# Patient Record
Sex: FEMALE | Race: WHITE | NOT HISPANIC OR LATINO | Employment: UNEMPLOYED | URBAN - METROPOLITAN AREA
[De-identification: names, ages, dates, MRNs, and addresses within clinical notes are randomized per-mention and may not be internally consistent; named-entity substitution may affect disease eponyms.]

---

## 2022-07-21 ENCOUNTER — HOSPITAL ENCOUNTER (INPATIENT)
Facility: MEDICAL CENTER | Age: 22
LOS: 3 days | DRG: 472 | End: 2022-07-24
Attending: EMERGENCY MEDICINE | Admitting: SURGERY
Payer: COMMERCIAL

## 2022-07-21 ENCOUNTER — HOSPITAL ENCOUNTER (OUTPATIENT)
Dept: RADIOLOGY | Facility: MEDICAL CENTER | Age: 22
End: 2022-07-21

## 2022-07-21 ENCOUNTER — APPOINTMENT (OUTPATIENT)
Dept: RADIOLOGY | Facility: MEDICAL CENTER | Age: 22
DRG: 472 | End: 2022-07-21
Payer: COMMERCIAL

## 2022-07-21 ENCOUNTER — APPOINTMENT (OUTPATIENT)
Dept: RADIOLOGY | Facility: MEDICAL CENTER | Age: 22
DRG: 472 | End: 2022-07-21
Attending: NEUROLOGICAL SURGERY
Payer: COMMERCIAL

## 2022-07-21 DIAGNOSIS — K59.03 DRUG-INDUCED CONSTIPATION: ICD-10-CM

## 2022-07-21 DIAGNOSIS — S12.500A CLOSED DISPLACED FRACTURE OF SIXTH CERVICAL VERTEBRA, UNSPECIFIED FRACTURE MORPHOLOGY, INITIAL ENCOUNTER (HCC): ICD-10-CM

## 2022-07-21 DIAGNOSIS — R11.0 NAUSEA: ICD-10-CM

## 2022-07-21 PROBLEM — T14.90XA TRAUMA: Status: ACTIVE | Noted: 2022-07-21

## 2022-07-21 PROBLEM — Z53.09 CONTRAINDICATION TO DEEP VEIN THROMBOSIS (DVT) PROPHYLAXIS: Status: ACTIVE | Noted: 2022-07-21

## 2022-07-21 PROBLEM — Z86.69 HISTORY OF CHIARI MALFORMATION: Status: ACTIVE | Noted: 2022-07-21

## 2022-07-21 LAB
ABO + RH BLD: NORMAL
ABO GROUP BLD: NORMAL
ALBUMIN SERPL BCP-MCNC: 4.6 G/DL (ref 3.2–4.9)
ALBUMIN/GLOB SERPL: 1.8 G/DL
ALP SERPL-CCNC: 67 U/L (ref 30–99)
ALT SERPL-CCNC: 7 U/L (ref 2–50)
ANION GAP SERPL CALC-SCNC: 19 MMOL/L (ref 7–16)
APTT PPP: 26.8 SEC (ref 24.7–36)
AST SERPL-CCNC: 22 U/L (ref 12–45)
BILIRUB SERPL-MCNC: 2.1 MG/DL (ref 0.1–1.5)
BLD GP AB SCN SERPL QL: NORMAL
BUN SERPL-MCNC: 13 MG/DL (ref 8–22)
CALCIUM SERPL-MCNC: 8.9 MG/DL (ref 8.5–10.5)
CHLORIDE SERPL-SCNC: 109 MMOL/L (ref 96–112)
CO2 SERPL-SCNC: 13 MMOL/L (ref 20–33)
CREAT SERPL-MCNC: 0.96 MG/DL (ref 0.5–1.4)
ERYTHROCYTE [DISTWIDTH] IN BLOOD BY AUTOMATED COUNT: 38.8 FL (ref 35.9–50)
ETHANOL BLD-MCNC: <10.1 MG/DL
GFR SERPLBLD CREATININE-BSD FMLA CKD-EPI: 86 ML/MIN/1.73 M 2
GLOBULIN SER CALC-MCNC: 2.5 G/DL (ref 1.9–3.5)
GLUCOSE BLD STRIP.AUTO-MCNC: 104 MG/DL (ref 65–99)
GLUCOSE SERPL-MCNC: 100 MG/DL (ref 65–99)
HCG SERPL QL: NEGATIVE
HCT VFR BLD AUTO: 37.7 % (ref 37–47)
HGB BLD-MCNC: 14.1 G/DL (ref 12–16)
INR PPP: 1.14 (ref 0.87–1.13)
MCH RBC QN AUTO: 32 PG (ref 27–33)
MCHC RBC AUTO-ENTMCNC: 37.4 G/DL (ref 33.6–35)
MCV RBC AUTO: 85.7 FL (ref 81.4–97.8)
PLATELET # BLD AUTO: 274 K/UL (ref 164–446)
PMV BLD AUTO: 9.6 FL (ref 9–12.9)
POTASSIUM SERPL-SCNC: 2.9 MMOL/L (ref 3.6–5.5)
PROT SERPL-MCNC: 7.1 G/DL (ref 6–8.2)
PROTHROMBIN TIME: 14.4 SEC (ref 12–14.6)
RBC # BLD AUTO: 4.4 M/UL (ref 4.2–5.4)
RH BLD: NORMAL
SODIUM SERPL-SCNC: 141 MMOL/L (ref 135–145)
WBC # BLD AUTO: 16.4 K/UL (ref 4.8–10.8)

## 2022-07-21 PROCEDURE — 80053 COMPREHEN METABOLIC PANEL: CPT

## 2022-07-21 PROCEDURE — G0390 TRAUMA RESPONS W/HOSP CRITI: HCPCS

## 2022-07-21 PROCEDURE — 72141 MRI NECK SPINE W/O DYE: CPT

## 2022-07-21 PROCEDURE — 36415 COLL VENOUS BLD VENIPUNCTURE: CPT

## 2022-07-21 PROCEDURE — A9270 NON-COVERED ITEM OR SERVICE: HCPCS | Performed by: PHYSICIAN ASSISTANT

## 2022-07-21 PROCEDURE — 85730 THROMBOPLASTIN TIME PARTIAL: CPT

## 2022-07-21 PROCEDURE — 700105 HCHG RX REV CODE 258: Performed by: PHYSICIAN ASSISTANT

## 2022-07-21 PROCEDURE — 770022 HCHG ROOM/CARE - ICU (200)

## 2022-07-21 PROCEDURE — 99223 1ST HOSP IP/OBS HIGH 75: CPT | Mod: AI | Performed by: SURGERY

## 2022-07-21 PROCEDURE — 700102 HCHG RX REV CODE 250 W/ 637 OVERRIDE(OP): Performed by: PHYSICIAN ASSISTANT

## 2022-07-21 PROCEDURE — 85610 PROTHROMBIN TIME: CPT

## 2022-07-21 PROCEDURE — 86850 RBC ANTIBODY SCREEN: CPT

## 2022-07-21 PROCEDURE — 85027 COMPLETE CBC AUTOMATED: CPT

## 2022-07-21 PROCEDURE — 82962 GLUCOSE BLOOD TEST: CPT

## 2022-07-21 PROCEDURE — 51798 US URINE CAPACITY MEASURE: CPT

## 2022-07-21 PROCEDURE — 86900 BLOOD TYPING SEROLOGIC ABO: CPT

## 2022-07-21 PROCEDURE — 700111 HCHG RX REV CODE 636 W/ 250 OVERRIDE (IP): Performed by: SURGERY

## 2022-07-21 PROCEDURE — 84703 CHORIONIC GONADOTROPIN ASSAY: CPT

## 2022-07-21 PROCEDURE — 96374 THER/PROPH/DIAG INJ IV PUSH: CPT

## 2022-07-21 PROCEDURE — 99291 CRITICAL CARE FIRST HOUR: CPT

## 2022-07-21 PROCEDURE — 82077 ASSAY SPEC XCP UR&BREATH IA: CPT

## 2022-07-21 PROCEDURE — 86901 BLOOD TYPING SEROLOGIC RH(D): CPT

## 2022-07-21 PROCEDURE — 700111 HCHG RX REV CODE 636 W/ 250 OVERRIDE (IP): Mod: JZ | Performed by: PHYSICIAN ASSISTANT

## 2022-07-21 RX ORDER — ACETAMINOPHEN 500 MG
1000 TABLET ORAL EVERY 6 HOURS PRN
Status: DISCONTINUED | OUTPATIENT
Start: 2022-07-26 | End: 2022-07-24 | Stop reason: HOSPADM

## 2022-07-21 RX ORDER — FAMOTIDINE 20 MG/1
20 TABLET, FILM COATED ORAL 2 TIMES DAILY
Status: DISCONTINUED | OUTPATIENT
Start: 2022-07-21 | End: 2022-07-23

## 2022-07-21 RX ORDER — PROCHLORPERAZINE EDISYLATE 5 MG/ML
5-10 INJECTION INTRAMUSCULAR; INTRAVENOUS EVERY 4 HOURS PRN
Status: DISCONTINUED | OUTPATIENT
Start: 2022-07-21 | End: 2022-07-22

## 2022-07-21 RX ORDER — POLYETHYLENE GLYCOL 3350 17 G/17G
1 POWDER, FOR SOLUTION ORAL 2 TIMES DAILY
Status: DISCONTINUED | OUTPATIENT
Start: 2022-07-22 | End: 2022-07-24 | Stop reason: HOSPADM

## 2022-07-21 RX ORDER — LORAZEPAM 2 MG/ML
0.5 INJECTION INTRAMUSCULAR ONCE
Status: COMPLETED | OUTPATIENT
Start: 2022-07-21 | End: 2022-07-21

## 2022-07-21 RX ORDER — GABAPENTIN 100 MG/1
100 CAPSULE ORAL EVERY 8 HOURS
Status: DISCONTINUED | OUTPATIENT
Start: 2022-07-21 | End: 2022-07-24 | Stop reason: HOSPADM

## 2022-07-21 RX ORDER — ACETAMINOPHEN 500 MG
1000 TABLET ORAL EVERY 6 HOURS
Status: DISCONTINUED | OUTPATIENT
Start: 2022-07-21 | End: 2022-07-24 | Stop reason: HOSPADM

## 2022-07-21 RX ORDER — DOCUSATE SODIUM 100 MG/1
100 CAPSULE, LIQUID FILLED ORAL 2 TIMES DAILY
Status: DISCONTINUED | OUTPATIENT
Start: 2022-07-22 | End: 2022-07-22

## 2022-07-21 RX ORDER — SERTRALINE HYDROCHLORIDE 100 MG/1
100 TABLET, FILM COATED ORAL DAILY
COMMUNITY

## 2022-07-21 RX ORDER — HYDROMORPHONE HYDROCHLORIDE 1 MG/ML
0.5 INJECTION, SOLUTION INTRAMUSCULAR; INTRAVENOUS; SUBCUTANEOUS
Status: DISCONTINUED | OUTPATIENT
Start: 2022-07-21 | End: 2022-07-24 | Stop reason: HOSPADM

## 2022-07-21 RX ORDER — AMOXICILLIN 250 MG
1 CAPSULE ORAL
Status: DISCONTINUED | OUTPATIENT
Start: 2022-07-21 | End: 2022-07-22

## 2022-07-21 RX ORDER — OXYCODONE HYDROCHLORIDE 10 MG/1
10 TABLET ORAL
Status: DISCONTINUED | OUTPATIENT
Start: 2022-07-21 | End: 2022-07-24 | Stop reason: HOSPADM

## 2022-07-21 RX ORDER — DEXTROSE MONOHYDRATE 25 G/50ML
25 INJECTION, SOLUTION INTRAVENOUS
Status: DISCONTINUED | OUTPATIENT
Start: 2022-07-21 | End: 2022-07-22

## 2022-07-21 RX ORDER — METAXALONE 800 MG/1
800 TABLET ORAL 3 TIMES DAILY
Status: DISCONTINUED | OUTPATIENT
Start: 2022-07-21 | End: 2022-07-24 | Stop reason: HOSPADM

## 2022-07-21 RX ORDER — LIDOCAINE 50 MG/G
1 PATCH TOPICAL EVERY 24 HOURS
Status: DISCONTINUED | OUTPATIENT
Start: 2022-07-22 | End: 2022-07-24 | Stop reason: HOSPADM

## 2022-07-21 RX ORDER — AMOXICILLIN 250 MG
1 CAPSULE ORAL NIGHTLY
Status: DISCONTINUED | OUTPATIENT
Start: 2022-07-21 | End: 2022-07-22

## 2022-07-21 RX ORDER — OXYCODONE HYDROCHLORIDE 5 MG/1
5 TABLET ORAL
Status: DISCONTINUED | OUTPATIENT
Start: 2022-07-21 | End: 2022-07-24 | Stop reason: HOSPADM

## 2022-07-21 RX ORDER — BISACODYL 10 MG
10 SUPPOSITORY, RECTAL RECTAL
Status: DISCONTINUED | OUTPATIENT
Start: 2022-07-21 | End: 2022-07-22

## 2022-07-21 RX ORDER — SODIUM CHLORIDE 9 MG/ML
INJECTION, SOLUTION INTRAVENOUS CONTINUOUS
Status: DISCONTINUED | OUTPATIENT
Start: 2022-07-21 | End: 2022-07-23

## 2022-07-21 RX ADMIN — LORAZEPAM 0.5 MG: 2 INJECTION INTRAMUSCULAR; INTRAVENOUS at 19:20

## 2022-07-21 RX ADMIN — HYDROMORPHONE HYDROCHLORIDE 0.5 MG: 1 INJECTION, SOLUTION INTRAMUSCULAR; INTRAVENOUS; SUBCUTANEOUS at 21:47

## 2022-07-21 RX ADMIN — SODIUM CHLORIDE: 9 INJECTION, SOLUTION INTRAVENOUS at 20:34

## 2022-07-21 RX ADMIN — OXYCODONE HYDROCHLORIDE 10 MG: 10 TABLET ORAL at 20:34

## 2022-07-21 RX ADMIN — GABAPENTIN 100 MG: 100 CAPSULE ORAL at 21:11

## 2022-07-21 RX ADMIN — FAMOTIDINE 20 MG: 20 TABLET, FILM COATED ORAL at 20:34

## 2022-07-21 RX ADMIN — PROCHLORPERAZINE EDISYLATE 5 MG: 5 INJECTION INTRAMUSCULAR; INTRAVENOUS at 21:17

## 2022-07-21 RX ADMIN — METAXALONE 800 MG: 800 TABLET ORAL at 20:33

## 2022-07-21 RX ADMIN — ACETAMINOPHEN 1000 MG: 500 TABLET, FILM COATED ORAL at 20:33

## 2022-07-21 ASSESSMENT — PAIN DESCRIPTION - PAIN TYPE
TYPE: ACUTE PAIN

## 2022-07-21 ASSESSMENT — FIBROSIS 4 INDEX: FIB4 SCORE: 0.64

## 2022-07-21 ASSESSMENT — PATIENT HEALTH QUESTIONNAIRE - PHQ9
2. FEELING DOWN, DEPRESSED, IRRITABLE, OR HOPELESS: NOT AT ALL
1. LITTLE INTEREST OR PLEASURE IN DOING THINGS: NOT AT ALL
SUM OF ALL RESPONSES TO PHQ9 QUESTIONS 1 AND 2: 0

## 2022-07-21 ASSESSMENT — COPD QUESTIONNAIRES
DURING THE PAST 4 WEEKS HOW MUCH DID YOU FEEL SHORT OF BREATH: NONE/LITTLE OF THE TIME
COPD SCREENING SCORE: 0
HAVE YOU SMOKED AT LEAST 100 CIGARETTES IN YOUR ENTIRE LIFE: NO/DON'T KNOW
DO YOU EVER COUGH UP ANY MUCUS OR PHLEGM?: NO/ONLY WITH OCCASIONAL COLDS OR INFECTIONS

## 2022-07-21 ASSESSMENT — LIFESTYLE VARIABLES: EVER_SMOKED: NEVER

## 2022-07-22 ENCOUNTER — APPOINTMENT (OUTPATIENT)
Dept: RADIOLOGY | Facility: MEDICAL CENTER | Age: 22
DRG: 472 | End: 2022-07-22
Attending: NEUROLOGICAL SURGERY
Payer: COMMERCIAL

## 2022-07-22 ENCOUNTER — ANESTHESIA (OUTPATIENT)
Dept: SURGERY | Facility: MEDICAL CENTER | Age: 22
DRG: 472 | End: 2022-07-22
Payer: COMMERCIAL

## 2022-07-22 ENCOUNTER — ANESTHESIA EVENT (OUTPATIENT)
Dept: SURGERY | Facility: MEDICAL CENTER | Age: 22
DRG: 472 | End: 2022-07-22
Payer: COMMERCIAL

## 2022-07-22 PROBLEM — F32.A DEPRESSION: Status: ACTIVE | Noted: 2022-07-22

## 2022-07-22 PROBLEM — Z78.9 NO CONTRAINDICATION TO DEEP VEIN THROMBOSIS (DVT) PROPHYLAXIS: Status: ACTIVE | Noted: 2022-07-21

## 2022-07-22 LAB
ALBUMIN SERPL BCP-MCNC: 4 G/DL (ref 3.2–4.9)
ALBUMIN/GLOB SERPL: 1.8 G/DL
ALP SERPL-CCNC: 57 U/L (ref 30–99)
ALT SERPL-CCNC: 5 U/L (ref 2–50)
ANION GAP SERPL CALC-SCNC: 12 MMOL/L (ref 7–16)
AST SERPL-CCNC: 17 U/L (ref 12–45)
BASOPHILS # BLD AUTO: 0.4 % (ref 0–1.8)
BASOPHILS # BLD: 0.03 K/UL (ref 0–0.12)
BILIRUB SERPL-MCNC: 1.3 MG/DL (ref 0.1–1.5)
BUN SERPL-MCNC: 11 MG/DL (ref 8–22)
CALCIUM SERPL-MCNC: 8.5 MG/DL (ref 8.5–10.5)
CHLORIDE SERPL-SCNC: 112 MMOL/L (ref 96–112)
CO2 SERPL-SCNC: 18 MMOL/L (ref 20–33)
CREAT SERPL-MCNC: 0.73 MG/DL (ref 0.5–1.4)
EOSINOPHIL # BLD AUTO: 0.05 K/UL (ref 0–0.51)
EOSINOPHIL NFR BLD: 0.7 % (ref 0–6.9)
ERYTHROCYTE [DISTWIDTH] IN BLOOD BY AUTOMATED COUNT: 41.2 FL (ref 35.9–50)
GFR SERPLBLD CREATININE-BSD FMLA CKD-EPI: 120 ML/MIN/1.73 M 2
GLOBULIN SER CALC-MCNC: 2.2 G/DL (ref 1.9–3.5)
GLUCOSE BLD STRIP.AUTO-MCNC: 109 MG/DL (ref 65–99)
GLUCOSE BLD STRIP.AUTO-MCNC: 89 MG/DL (ref 65–99)
GLUCOSE BLD STRIP.AUTO-MCNC: 94 MG/DL (ref 65–99)
GLUCOSE BLD STRIP.AUTO-MCNC: 96 MG/DL (ref 65–99)
GLUCOSE SERPL-MCNC: 94 MG/DL (ref 65–99)
HCT VFR BLD AUTO: 35.9 % (ref 37–47)
HGB BLD-MCNC: 13.2 G/DL (ref 12–16)
IMM GRANULOCYTES # BLD AUTO: 0.03 K/UL (ref 0–0.11)
IMM GRANULOCYTES NFR BLD AUTO: 0.4 % (ref 0–0.9)
LYMPHOCYTES # BLD AUTO: 1.22 K/UL (ref 1–4.8)
LYMPHOCYTES NFR BLD: 17.4 % (ref 22–41)
MCH RBC QN AUTO: 32.2 PG (ref 27–33)
MCHC RBC AUTO-ENTMCNC: 36.8 G/DL (ref 33.6–35)
MCV RBC AUTO: 87.6 FL (ref 81.4–97.8)
MONOCYTES # BLD AUTO: 0.69 K/UL (ref 0–0.85)
MONOCYTES NFR BLD AUTO: 9.9 % (ref 0–13.4)
NEUTROPHILS # BLD AUTO: 4.98 K/UL (ref 2–7.15)
NEUTROPHILS NFR BLD: 71.2 % (ref 44–72)
NRBC # BLD AUTO: 0 K/UL
NRBC BLD-RTO: 0 /100 WBC
PLATELET # BLD AUTO: 209 K/UL (ref 164–446)
PMV BLD AUTO: 9.4 FL (ref 9–12.9)
POTASSIUM SERPL-SCNC: 3.9 MMOL/L (ref 3.6–5.5)
PROT SERPL-MCNC: 6.2 G/DL (ref 6–8.2)
RBC # BLD AUTO: 4.1 M/UL (ref 4.2–5.4)
SODIUM SERPL-SCNC: 142 MMOL/L (ref 135–145)
WBC # BLD AUTO: 7 K/UL (ref 4.8–10.8)

## 2022-07-22 PROCEDURE — 700105 HCHG RX REV CODE 258: Performed by: ANESTHESIOLOGY

## 2022-07-22 PROCEDURE — 72040 X-RAY EXAM NECK SPINE 2-3 VW: CPT

## 2022-07-22 PROCEDURE — 00600 ANES PX CRV SPINE&CORD NOS: CPT | Performed by: ANESTHESIOLOGY

## 2022-07-22 PROCEDURE — 95938 SOMATOSENSORY TESTING: CPT | Performed by: NEUROLOGICAL SURGERY

## 2022-07-22 PROCEDURE — 700101 HCHG RX REV CODE 250

## 2022-07-22 PROCEDURE — 95867 NDL EMG CRANIAL NRV MUSC UNI: CPT | Performed by: NEUROLOGICAL SURGERY

## 2022-07-22 PROCEDURE — 82962 GLUCOSE BLOOD TEST: CPT

## 2022-07-22 PROCEDURE — A9270 NON-COVERED ITEM OR SERVICE: HCPCS | Performed by: PHYSICIAN ASSISTANT

## 2022-07-22 PROCEDURE — 160041 HCHG SURGERY MINUTES - EA ADDL 1 MIN LEVEL 4: Performed by: NEUROLOGICAL SURGERY

## 2022-07-22 PROCEDURE — 95955 EEG DURING SURGERY: CPT | Performed by: NEUROLOGICAL SURGERY

## 2022-07-22 PROCEDURE — 700101 HCHG RX REV CODE 250: Performed by: NEUROLOGICAL SURGERY

## 2022-07-22 PROCEDURE — 0RG20A0 FUSION OF 2 OR MORE CERVICAL VERTEBRAL JOINTS WITH INTERBODY FUSION DEVICE, ANTERIOR APPROACH, ANTERIOR COLUMN, OPEN APPROACH: ICD-10-PCS | Performed by: NEUROLOGICAL SURGERY

## 2022-07-22 PROCEDURE — 700111 HCHG RX REV CODE 636 W/ 250 OVERRIDE (IP): Mod: JZ

## 2022-07-22 PROCEDURE — 770022 HCHG ROOM/CARE - ICU (200)

## 2022-07-22 PROCEDURE — 502000 HCHG MISC OR IMPLANTS RC 0278: Performed by: NEUROLOGICAL SURGERY

## 2022-07-22 PROCEDURE — 4A11X4G MONITORING OF PERIPHERAL NERVOUS ELECTRICAL ACTIVITY, INTRAOPERATIVE, EXTERNAL APPROACH: ICD-10-PCS | Performed by: NEUROLOGICAL SURGERY

## 2022-07-22 PROCEDURE — 99233 SBSQ HOSP IP/OBS HIGH 50: CPT | Performed by: SURGERY

## 2022-07-22 PROCEDURE — 160009 HCHG ANES TIME/MIN: Performed by: NEUROLOGICAL SURGERY

## 2022-07-22 PROCEDURE — 160048 HCHG OR STATISTICAL LEVEL 1-5: Performed by: NEUROLOGICAL SURGERY

## 2022-07-22 PROCEDURE — 700101 HCHG RX REV CODE 250: Performed by: PHYSICIAN ASSISTANT

## 2022-07-22 PROCEDURE — 95939 C MOTOR EVOKED UPR&LWR LIMBS: CPT | Performed by: NEUROLOGICAL SURGERY

## 2022-07-22 PROCEDURE — 700111 HCHG RX REV CODE 636 W/ 250 OVERRIDE (IP): Performed by: PHYSICIAN ASSISTANT

## 2022-07-22 PROCEDURE — 80053 COMPREHEN METABOLIC PANEL: CPT

## 2022-07-22 PROCEDURE — 160029 HCHG SURGERY MINUTES - 1ST 30 MINS LEVEL 4: Performed by: NEUROLOGICAL SURGERY

## 2022-07-22 PROCEDURE — C1713 ANCHOR/SCREW BN/BN,TIS/BN: HCPCS | Performed by: NEUROLOGICAL SURGERY

## 2022-07-22 PROCEDURE — 700111 HCHG RX REV CODE 636 W/ 250 OVERRIDE (IP): Mod: JZ | Performed by: NEUROLOGICAL SURGERY

## 2022-07-22 PROCEDURE — 0RB30ZZ EXCISION OF CERVICAL VERTEBRAL DISC, OPEN APPROACH: ICD-10-PCS | Performed by: NEUROLOGICAL SURGERY

## 2022-07-22 PROCEDURE — 85025 COMPLETE CBC W/AUTO DIFF WBC: CPT

## 2022-07-22 PROCEDURE — 160035 HCHG PACU - 1ST 60 MINS PHASE I: Performed by: NEUROLOGICAL SURGERY

## 2022-07-22 PROCEDURE — 700111 HCHG RX REV CODE 636 W/ 250 OVERRIDE (IP): Performed by: ANESTHESIOLOGY

## 2022-07-22 PROCEDURE — 700101 HCHG RX REV CODE 250: Performed by: ANESTHESIOLOGY

## 2022-07-22 PROCEDURE — C1821 INTERSPINOUS IMPLANT: HCPCS | Performed by: NEUROLOGICAL SURGERY

## 2022-07-22 PROCEDURE — 700105 HCHG RX REV CODE 258

## 2022-07-22 PROCEDURE — 160002 HCHG RECOVERY MINUTES (STAT): Performed by: NEUROLOGICAL SURGERY

## 2022-07-22 PROCEDURE — 95940 IONM IN OPERATNG ROOM 15 MIN: CPT | Performed by: NEUROLOGICAL SURGERY

## 2022-07-22 PROCEDURE — 95937 NEUROMUSCULAR JUNCTION TEST: CPT | Performed by: NEUROLOGICAL SURGERY

## 2022-07-22 PROCEDURE — 00NW0ZZ RELEASE CERVICAL SPINAL CORD, OPEN APPROACH: ICD-10-PCS | Performed by: NEUROLOGICAL SURGERY

## 2022-07-22 PROCEDURE — 110371 HCHG SHELL REV 272: Performed by: NEUROLOGICAL SURGERY

## 2022-07-22 PROCEDURE — 700105 HCHG RX REV CODE 258: Performed by: PHYSICIAN ASSISTANT

## 2022-07-22 PROCEDURE — 95861 NEEDLE EMG 2 EXTREMITIES: CPT | Performed by: NEUROLOGICAL SURGERY

## 2022-07-22 PROCEDURE — 700102 HCHG RX REV CODE 250 W/ 637 OVERRIDE(OP): Performed by: PHYSICIAN ASSISTANT

## 2022-07-22 PROCEDURE — 110454 HCHG SHELL REV 250: Performed by: NEUROLOGICAL SURGERY

## 2022-07-22 DEVICE — GRAFT BONE PASTE GRAFTON PLUS 1CC (1EA): Type: IMPLANTABLE DEVICE | Site: SPINE CERVICAL | Status: FUNCTIONAL

## 2022-07-22 DEVICE — IMPLANTABLE DEVICE: Type: IMPLANTABLE DEVICE | Site: SPINE CERVICAL | Status: FUNCTIONAL

## 2022-07-22 RX ORDER — PROMETHAZINE HYDROCHLORIDE 25 MG/1
12.5-25 TABLET ORAL EVERY 4 HOURS PRN
Status: DISCONTINUED | OUTPATIENT
Start: 2022-07-22 | End: 2022-07-24 | Stop reason: HOSPADM

## 2022-07-22 RX ORDER — AMOXICILLIN 250 MG
1 CAPSULE ORAL NIGHTLY
Status: DISCONTINUED | OUTPATIENT
Start: 2022-07-22 | End: 2022-07-24 | Stop reason: HOSPADM

## 2022-07-22 RX ORDER — PHENYLEPHRINE HCL IN 0.9% NACL 0.5 MG/5ML
SYRINGE (ML) INTRAVENOUS PRN
Status: DISCONTINUED | OUTPATIENT
Start: 2022-07-22 | End: 2022-07-22 | Stop reason: SURG

## 2022-07-22 RX ORDER — CEFAZOLIN SODIUM 1 G/3ML
INJECTION, POWDER, FOR SOLUTION INTRAMUSCULAR; INTRAVENOUS
Status: DISCONTINUED | OUTPATIENT
Start: 2022-07-22 | End: 2022-07-22 | Stop reason: HOSPADM

## 2022-07-22 RX ORDER — BISACODYL 10 MG
10 SUPPOSITORY, RECTAL RECTAL
Status: DISCONTINUED | OUTPATIENT
Start: 2022-07-22 | End: 2022-07-24 | Stop reason: HOSPADM

## 2022-07-22 RX ORDER — OXYCODONE HCL 5 MG/5 ML
10 SOLUTION, ORAL ORAL
Status: DISCONTINUED | OUTPATIENT
Start: 2022-07-22 | End: 2022-07-22 | Stop reason: HOSPADM

## 2022-07-22 RX ORDER — CALCIUM CARBONATE 500 MG/1
500 TABLET, CHEWABLE ORAL 2 TIMES DAILY
Status: DISCONTINUED | OUTPATIENT
Start: 2022-07-22 | End: 2022-07-24 | Stop reason: HOSPADM

## 2022-07-22 RX ORDER — METHOCARBAMOL 750 MG/1
750 TABLET, FILM COATED ORAL EVERY 8 HOURS PRN
Status: DISCONTINUED | OUTPATIENT
Start: 2022-07-22 | End: 2022-07-23

## 2022-07-22 RX ORDER — PROMETHAZINE HYDROCHLORIDE 25 MG/1
12.5-25 SUPPOSITORY RECTAL EVERY 4 HOURS PRN
Status: DISCONTINUED | OUTPATIENT
Start: 2022-07-22 | End: 2022-07-24 | Stop reason: HOSPADM

## 2022-07-22 RX ORDER — HALOPERIDOL 5 MG/ML
1 INJECTION INTRAMUSCULAR
Status: DISCONTINUED | OUTPATIENT
Start: 2022-07-22 | End: 2022-07-22 | Stop reason: HOSPADM

## 2022-07-22 RX ORDER — DOCUSATE SODIUM 100 MG/1
100 CAPSULE, LIQUID FILLED ORAL 2 TIMES DAILY
Status: DISCONTINUED | OUTPATIENT
Start: 2022-07-22 | End: 2022-07-24 | Stop reason: HOSPADM

## 2022-07-22 RX ORDER — SODIUM CHLORIDE, SODIUM LACTATE, POTASSIUM CHLORIDE, CALCIUM CHLORIDE 600; 310; 30; 20 MG/100ML; MG/100ML; MG/100ML; MG/100ML
INJECTION, SOLUTION INTRAVENOUS
Status: DISCONTINUED | OUTPATIENT
Start: 2022-07-22 | End: 2022-07-22 | Stop reason: SURG

## 2022-07-22 RX ORDER — DEXAMETHASONE SODIUM PHOSPHATE 4 MG/ML
INJECTION, SOLUTION INTRA-ARTICULAR; INTRALESIONAL; INTRAMUSCULAR; INTRAVENOUS; SOFT TISSUE PRN
Status: DISCONTINUED | OUTPATIENT
Start: 2022-07-22 | End: 2022-07-22 | Stop reason: SURG

## 2022-07-22 RX ORDER — CEFAZOLIN SODIUM 1 G/3ML
INJECTION, POWDER, FOR SOLUTION INTRAMUSCULAR; INTRAVENOUS PRN
Status: DISCONTINUED | OUTPATIENT
Start: 2022-07-22 | End: 2022-07-22 | Stop reason: SURG

## 2022-07-22 RX ORDER — LABETALOL HYDROCHLORIDE 5 MG/ML
10 INJECTION, SOLUTION INTRAVENOUS
Status: DISCONTINUED | OUTPATIENT
Start: 2022-07-22 | End: 2022-07-23

## 2022-07-22 RX ORDER — DIPHENHYDRAMINE HCL 25 MG
25 TABLET ORAL EVERY 6 HOURS PRN
Status: DISCONTINUED | OUTPATIENT
Start: 2022-07-22 | End: 2022-07-24 | Stop reason: HOSPADM

## 2022-07-22 RX ORDER — POLYETHYLENE GLYCOL 3350 17 G/17G
1 POWDER, FOR SOLUTION ORAL 2 TIMES DAILY PRN
Status: DISCONTINUED | OUTPATIENT
Start: 2022-07-22 | End: 2022-07-24 | Stop reason: HOSPADM

## 2022-07-22 RX ORDER — MIDAZOLAM HYDROCHLORIDE 1 MG/ML
1 INJECTION INTRAMUSCULAR; INTRAVENOUS
Status: DISCONTINUED | OUTPATIENT
Start: 2022-07-22 | End: 2022-07-22 | Stop reason: HOSPADM

## 2022-07-22 RX ORDER — OXYCODONE HCL 5 MG/5 ML
5 SOLUTION, ORAL ORAL
Status: DISCONTINUED | OUTPATIENT
Start: 2022-07-22 | End: 2022-07-22 | Stop reason: HOSPADM

## 2022-07-22 RX ORDER — MIDAZOLAM HYDROCHLORIDE 1 MG/ML
INJECTION INTRAMUSCULAR; INTRAVENOUS PRN
Status: DISCONTINUED | OUTPATIENT
Start: 2022-07-22 | End: 2022-07-22 | Stop reason: HOSPADM

## 2022-07-22 RX ORDER — HYDROMORPHONE HYDROCHLORIDE 1 MG/ML
0.4 INJECTION, SOLUTION INTRAMUSCULAR; INTRAVENOUS; SUBCUTANEOUS
Status: DISCONTINUED | OUTPATIENT
Start: 2022-07-22 | End: 2022-07-22 | Stop reason: HOSPADM

## 2022-07-22 RX ORDER — MEPERIDINE HYDROCHLORIDE 25 MG/ML
12.5 INJECTION INTRAMUSCULAR; INTRAVENOUS; SUBCUTANEOUS
Status: DISCONTINUED | OUTPATIENT
Start: 2022-07-22 | End: 2022-07-22 | Stop reason: HOSPADM

## 2022-07-22 RX ORDER — METOPROLOL TARTRATE 1 MG/ML
1 INJECTION, SOLUTION INTRAVENOUS
Status: DISCONTINUED | OUTPATIENT
Start: 2022-07-22 | End: 2022-07-22 | Stop reason: HOSPADM

## 2022-07-22 RX ORDER — BUPIVACAINE HYDROCHLORIDE AND EPINEPHRINE 5; 5 MG/ML; UG/ML
INJECTION, SOLUTION EPIDURAL; INTRACAUDAL; PERINEURAL
Status: DISCONTINUED | OUTPATIENT
Start: 2022-07-22 | End: 2022-07-22 | Stop reason: HOSPADM

## 2022-07-22 RX ORDER — PROCHLORPERAZINE EDISYLATE 5 MG/ML
5-10 INJECTION INTRAMUSCULAR; INTRAVENOUS EVERY 4 HOURS PRN
Status: DISCONTINUED | OUTPATIENT
Start: 2022-07-22 | End: 2022-07-24 | Stop reason: HOSPADM

## 2022-07-22 RX ORDER — DIPHENHYDRAMINE HYDROCHLORIDE 50 MG/ML
25 INJECTION, SOLUTION INTRAMUSCULAR; INTRAVENOUS EVERY 6 HOURS PRN
Status: DISCONTINUED | OUTPATIENT
Start: 2022-07-22 | End: 2022-07-24 | Stop reason: HOSPADM

## 2022-07-22 RX ORDER — HYDROMORPHONE HYDROCHLORIDE 1 MG/ML
0.1 INJECTION, SOLUTION INTRAMUSCULAR; INTRAVENOUS; SUBCUTANEOUS
Status: DISCONTINUED | OUTPATIENT
Start: 2022-07-22 | End: 2022-07-22 | Stop reason: HOSPADM

## 2022-07-22 RX ORDER — ROCURONIUM BROMIDE 10 MG/ML
INJECTION, SOLUTION INTRAVENOUS PRN
Status: DISCONTINUED | OUTPATIENT
Start: 2022-07-22 | End: 2022-07-22 | Stop reason: SURG

## 2022-07-22 RX ORDER — SERTRALINE HYDROCHLORIDE 100 MG/1
100 TABLET, FILM COATED ORAL DAILY
Status: DISCONTINUED | OUTPATIENT
Start: 2022-07-23 | End: 2022-07-24 | Stop reason: HOSPADM

## 2022-07-22 RX ORDER — HYDRALAZINE HYDROCHLORIDE 20 MG/ML
5 INJECTION INTRAMUSCULAR; INTRAVENOUS
Status: DISCONTINUED | OUTPATIENT
Start: 2022-07-22 | End: 2022-07-22 | Stop reason: HOSPADM

## 2022-07-22 RX ORDER — GLYCOPYRROLATE 0.2 MG/ML
INJECTION INTRAMUSCULAR; INTRAVENOUS PRN
Status: DISCONTINUED | OUTPATIENT
Start: 2022-07-22 | End: 2022-07-22 | Stop reason: SURG

## 2022-07-22 RX ORDER — DIPHENHYDRAMINE HYDROCHLORIDE 50 MG/ML
12.5 INJECTION, SOLUTION INTRAMUSCULAR; INTRAVENOUS
Status: DISCONTINUED | OUTPATIENT
Start: 2022-07-22 | End: 2022-07-22 | Stop reason: HOSPADM

## 2022-07-22 RX ORDER — AMOXICILLIN 250 MG
1 CAPSULE ORAL
Status: DISCONTINUED | OUTPATIENT
Start: 2022-07-22 | End: 2022-07-24 | Stop reason: HOSPADM

## 2022-07-22 RX ORDER — SUCCINYLCHOLINE CHLORIDE 20 MG/ML
INJECTION INTRAMUSCULAR; INTRAVENOUS PRN
Status: DISCONTINUED | OUTPATIENT
Start: 2022-07-22 | End: 2022-07-22 | Stop reason: SURG

## 2022-07-22 RX ORDER — MORPHINE SULFATE 0.5 MG/ML
INJECTION, SOLUTION EPIDURAL; INTRATHECAL; INTRAVENOUS PRN
Status: DISCONTINUED | OUTPATIENT
Start: 2022-07-22 | End: 2022-07-22 | Stop reason: SURG

## 2022-07-22 RX ORDER — IPRATROPIUM BROMIDE AND ALBUTEROL SULFATE 2.5; .5 MG/3ML; MG/3ML
3 SOLUTION RESPIRATORY (INHALATION)
Status: DISCONTINUED | OUTPATIENT
Start: 2022-07-22 | End: 2022-07-22 | Stop reason: HOSPADM

## 2022-07-22 RX ORDER — HYDROMORPHONE HYDROCHLORIDE 1 MG/ML
0.2 INJECTION, SOLUTION INTRAMUSCULAR; INTRAVENOUS; SUBCUTANEOUS
Status: DISCONTINUED | OUTPATIENT
Start: 2022-07-22 | End: 2022-07-22 | Stop reason: HOSPADM

## 2022-07-22 RX ORDER — ENOXAPARIN SODIUM 100 MG/ML
40 INJECTION SUBCUTANEOUS DAILY
Status: DISCONTINUED | OUTPATIENT
Start: 2022-07-23 | End: 2022-07-24 | Stop reason: HOSPADM

## 2022-07-22 RX ORDER — CEFAZOLIN SODIUM 2 G/100ML
2 INJECTION, SOLUTION INTRAVENOUS EVERY 8 HOURS
Status: DISPENSED | OUTPATIENT
Start: 2022-07-22 | End: 2022-07-23

## 2022-07-22 RX ADMIN — SODIUM CHLORIDE, POTASSIUM CHLORIDE, SODIUM LACTATE AND CALCIUM CHLORIDE: 600; 310; 30; 20 INJECTION, SOLUTION INTRAVENOUS at 13:43

## 2022-07-22 RX ADMIN — CEFAZOLIN 2 G: 330 INJECTION, POWDER, FOR SOLUTION INTRAMUSCULAR; INTRAVENOUS at 12:28

## 2022-07-22 RX ADMIN — LIDOCAINE 1 PATCH: 50 PATCH TOPICAL at 05:18

## 2022-07-22 RX ADMIN — ROCURONIUM BROMIDE 10 MG: 10 INJECTION, SOLUTION INTRAVENOUS at 12:08

## 2022-07-22 RX ADMIN — GABAPENTIN 100 MG: 100 CAPSULE ORAL at 22:40

## 2022-07-22 RX ADMIN — FAMOTIDINE 20 MG: 20 TABLET, FILM COATED ORAL at 17:45

## 2022-07-22 RX ADMIN — PROPOFOL 180 MG: 10 INJECTION, EMULSION INTRAVENOUS at 12:08

## 2022-07-22 RX ADMIN — FENTANYL CITRATE 50 MCG: 50 INJECTION, SOLUTION INTRAMUSCULAR; INTRAVENOUS at 12:46

## 2022-07-22 RX ADMIN — PROCHLORPERAZINE EDISYLATE 10 MG: 5 INJECTION INTRAMUSCULAR; INTRAVENOUS at 19:32

## 2022-07-22 RX ADMIN — MORPHINE SULFATE 5 MG: 0.5 INJECTION EPIDURAL; INTRATHECAL; INTRAVENOUS at 14:25

## 2022-07-22 RX ADMIN — METAXALONE 800 MG: 800 TABLET ORAL at 05:18

## 2022-07-22 RX ADMIN — HYDROMORPHONE HYDROCHLORIDE 0.5 MG: 1 INJECTION, SOLUTION INTRAMUSCULAR; INTRAVENOUS; SUBCUTANEOUS at 04:02

## 2022-07-22 RX ADMIN — GABAPENTIN 100 MG: 100 CAPSULE ORAL at 05:18

## 2022-07-22 RX ADMIN — GLYCOPYRROLATE 0.2 MG: 0.2 INJECTION INTRAMUSCULAR; INTRAVENOUS at 12:04

## 2022-07-22 RX ADMIN — ACETAMINOPHEN 1000 MG: 500 TABLET, FILM COATED ORAL at 23:32

## 2022-07-22 RX ADMIN — DEXAMETHASONE SODIUM PHOSPHATE 8 MG: 4 INJECTION, SOLUTION INTRA-ARTICULAR; INTRALESIONAL; INTRAMUSCULAR; INTRAVENOUS; SOFT TISSUE at 12:37

## 2022-07-22 RX ADMIN — METAXALONE 800 MG: 800 TABLET ORAL at 17:45

## 2022-07-22 RX ADMIN — SODIUM CHLORIDE: 9 INJECTION, SOLUTION INTRAVENOUS at 06:48

## 2022-07-22 RX ADMIN — FENTANYL CITRATE 100 MCG: 50 INJECTION, SOLUTION INTRAMUSCULAR; INTRAVENOUS at 12:06

## 2022-07-22 RX ADMIN — ACETAMINOPHEN 1000 MG: 500 TABLET, FILM COATED ORAL at 17:45

## 2022-07-22 RX ADMIN — WATER 2 G: 1000 INJECTION, SOLUTION INTRAVENOUS at 20:18

## 2022-07-22 RX ADMIN — SUCCINYLCHOLINE CHLORIDE 120 MG: 20 INJECTION, SOLUTION INTRAMUSCULAR; INTRAVENOUS; PARENTERAL at 12:08

## 2022-07-22 RX ADMIN — FAMOTIDINE 20 MG: 10 INJECTION, SOLUTION INTRAVENOUS at 05:18

## 2022-07-22 RX ADMIN — HYDROMORPHONE HYDROCHLORIDE 0.5 MG: 1 INJECTION, SOLUTION INTRAMUSCULAR; INTRAVENOUS; SUBCUTANEOUS at 19:24

## 2022-07-22 RX ADMIN — PROCHLORPERAZINE EDISYLATE 10 MG: 5 INJECTION INTRAMUSCULAR; INTRAVENOUS at 10:37

## 2022-07-22 RX ADMIN — SODIUM CHLORIDE, POTASSIUM CHLORIDE, SODIUM LACTATE AND CALCIUM CHLORIDE: 600; 310; 30; 20 INJECTION, SOLUTION INTRAVENOUS at 11:57

## 2022-07-22 RX ADMIN — MIDAZOLAM HYDROCHLORIDE 2 MG: 1 INJECTION, SOLUTION INTRAMUSCULAR; INTRAVENOUS at 11:59

## 2022-07-22 RX ADMIN — HYDROMORPHONE HYDROCHLORIDE 0.5 MG: 1 INJECTION, SOLUTION INTRAMUSCULAR; INTRAVENOUS; SUBCUTANEOUS at 07:07

## 2022-07-22 RX ADMIN — PROCHLORPERAZINE EDISYLATE 10 MG: 5 INJECTION INTRAMUSCULAR; INTRAVENOUS at 04:06

## 2022-07-22 RX ADMIN — ACETAMINOPHEN 1000 MG: 500 TABLET, FILM COATED ORAL at 05:18

## 2022-07-22 RX ADMIN — Medication 200 MCG: at 13:42

## 2022-07-22 RX ADMIN — HYDROMORPHONE HYDROCHLORIDE 0.5 MG: 1 INJECTION, SOLUTION INTRAMUSCULAR; INTRAVENOUS; SUBCUTANEOUS at 10:31

## 2022-07-22 RX ADMIN — FENTANYL CITRATE 100 MCG: 50 INJECTION, SOLUTION INTRAMUSCULAR; INTRAVENOUS at 13:25

## 2022-07-22 ASSESSMENT — PAIN DESCRIPTION - PAIN TYPE
TYPE: ACUTE PAIN
TYPE: SURGICAL PAIN
TYPE: ACUTE PAIN;SURGICAL PAIN
TYPE: SURGICAL PAIN
TYPE: ACUTE PAIN
TYPE: SURGICAL PAIN
TYPE: ACUTE PAIN
TYPE: ACUTE PAIN;SURGICAL PAIN
TYPE: ACUTE PAIN;SURGICAL PAIN
TYPE: SURGICAL PAIN
TYPE: ACUTE PAIN
TYPE: ACUTE PAIN
TYPE: ACUTE PAIN;SURGICAL PAIN
TYPE: ACUTE PAIN
TYPE: SURGICAL PAIN

## 2022-07-22 ASSESSMENT — LIFESTYLE VARIABLES
HAVE PEOPLE ANNOYED YOU BY CRITICIZING YOUR DRINKING: NO
TOTAL SCORE: 0
DOES PATIENT WANT TO STOP DRINKING: NO
EVER FELT BAD OR GUILTY ABOUT YOUR DRINKING: NO
TOTAL SCORE: 0
HAVE YOU EVER FELT YOU SHOULD CUT DOWN ON YOUR DRINKING: NO
ALCOHOL_USE: NO
EVER HAD A DRINK FIRST THING IN THE MORNING TO STEADY YOUR NERVES TO GET RID OF A HANGOVER: NO
ON A TYPICAL DAY WHEN YOU DRINK ALCOHOL HOW MANY DRINKS DO YOU HAVE: 0
TOTAL SCORE: 0
HOW MANY TIMES IN THE PAST YEAR HAVE YOU HAD 5 OR MORE DRINKS IN A DAY: 0
AVERAGE NUMBER OF DAYS PER WEEK YOU HAVE A DRINK CONTAINING ALCOHOL: 0
CONSUMPTION TOTAL: NEGATIVE

## 2022-07-22 ASSESSMENT — ENCOUNTER SYMPTOMS
HEADACHES: 0
MYALGIAS: 1
TINGLING: 1
DIZZINESS: 0
NECK PAIN: 1
ROS SKIN COMMENTS: ABRASION
ABDOMINAL PAIN: 0
SHORTNESS OF BREATH: 0
CHILLS: 0
WEAKNESS: 0
ROS GI COMMENTS: BM PTA
NAUSEA: 0
SENSORY CHANGE: 0
COUGH: 0
FOCAL WEAKNESS: 0
CONSTIPATION: 0
VOMITING: 0
FEVER: 0

## 2022-07-22 ASSESSMENT — PAIN SCALES - GENERAL: PAIN_LEVEL: 0

## 2022-07-22 NOTE — ED NOTES
Med Rec Complete per patient  Allergies Reviewed with patient  No antibiotics within the last 30 days  Patient's Preferred Pharmacy: [facility] [address]

## 2022-07-22 NOTE — THERAPY
Missed Therapy     Patient Name: Savanah Alvarenga  Age:  22 y.o., Sex:  female  Medical Record #: [id number]  Today's Date: 7/22/2022 07/22/22 0710   Interdisciplinary Plan of Care Collaboration   Collaboration Comments Pt pending surgery. Will eval as appropriate once post op

## 2022-07-22 NOTE — CARE PLAN
The patient is Watcher - Medium risk of patient condition declining or worsening    Shift Goals  Clinical Goals: Maintain c-spine; pain control  Patient Goals: pain control  Family Goals: rest    Progress made toward(s) clinical / shift goals:    Problem: Pain - Standard  Goal: Alleviation of pain or a reduction in pain to the patient’s comfort goal  Outcome: Progressing     Problem: Skin Integrity  Goal: Skin integrity is maintained or improved  Outcome: Progressing     Problem: Fall Risk  Goal: Patient will remain free from falls  Outcome: Progressing     Problem: Neuro Status  Goal: Neuro status will remain stable or improve  Outcome: Progressing     Problem: Neurovascular Monitoring  Goal: Patient's neurovascular status will be maintained or improve  Outcome: Progressing       Patient is not progressing towards the following goals:

## 2022-07-22 NOTE — PROGRESS NOTES
[place] J Collar applied to pt with RN assist. C spine precautions maintained.   If any issue or questions, please contact ortho tech team. '

## 2022-07-22 NOTE — ED NOTES
Pt BIB EMS, hospital transfer from [facility]. Per repot pt was fell from horse and trampled. Pt w/+helmet, -LOC. Pt report left side numbness/tingling, full ROM intact.

## 2022-07-22 NOTE — ANESTHESIA POSTPROCEDURE EVALUATION
Patient: Savanah Alvarenga    Procedure Summary     Date: 07/22/22 Room / Location: [place] [address] / SURGERY [facility]    Anesthesia Start: 1157 Anesthesia Stop: 1621    Procedure: DISCECTOMY, SPINE, CERVICAL, ANTERIOR APPROACH, WITH FUSION- CORPECTOMY C6 (N/A Spine Cervical) Diagnosis: (TEAR DROP FRACTURE OF C6 WITH POSTERIOR DISPLACEMENT)    Surgeons: Praasd Landers M.D. Responsible Provider: Jc Cai M.D.    Anesthesia Type: general ASA Status: 2          Final Anesthesia Type: general  Last vitals  BP   Blood Pressure: 127/58    Temp   35.9 °C (96.7 °F)    Pulse   82   Resp   18    SpO2   95 %      Anesthesia Post Evaluation    Patient location during evaluation: PACU  Patient participation: complete - patient participated  Level of consciousness: awake and alert  Pain score: 0    Airway patency: patent  Anesthetic complications: no  Cardiovascular status: hemodynamically stable  Respiratory status: acceptable  Hydration status: euvolemic    PONV: none          There were no known complications for this encounter.     Nurse Pain Score: 7 (NPRS)

## 2022-07-22 NOTE — OR SURGEON
Immediate Post OP Note    PreOp Diagnosis: Tear drop C6 fracture with posterior displacement      PostOp Diagnosis: Same      Procedure(s):  DISCECTOMY, SPINE, CERVICAL, ANTERIOR APPROACH, WITH FUSION- CORPECTOMY C6 - Wound Class: Clean    Surgeon(s):  Prasad Landers M.D.    Anesthesiologist/Type of Anesthesia:  Anesthesiologist: Jc Cai M.D./General    Surgical Staff:  Circulator: Consuelo Mckeon R.N.  Relief Circulator: Kamila Banuelos R.N.  Relief Scrub: Ford Timmons  Scrub Person: Giselle Elise Assist: Awilda Villalta P.A.-C.  Radiology Technologist: Jia Caballero    Specimens removed if any:  * No specimens in log *    Estimated Blood Loss: 200cc    Findings: Patient tolerated well, see full op note.    Complications: None        7/22/2022 4:13 PM DARA RodríguezAKeesha-MALIK

## 2022-07-22 NOTE — ASSESSMENT & PLAN NOTE
Helmeted, bucked off horse, then trampled on.   No LOC. Immediate BUE paresthesias.  Trauma Green Transfer Activation from [facility].  Mauricio Gaffney MD. Trauma Surgery.

## 2022-07-22 NOTE — ASSESSMENT & PLAN NOTE
Referring facility imaging with unstable cervical injury with fracture of the anterior inferior C6 vertebral body, posterior translocation of the posterior fragment and widening of the C6-7 facet joints bilaterally compatible with disruption of the posterior discal ligament Tatian complex, posterior longitudinal ligament, and posterior interspinous/facet ligamentous complex.  By report, referring facility CTA negative.  Cervical MRI with increased T2 signal intensity at C6-7 interspinous spaces likely representing ligamentous injury. There is no spinal cord injury.  7/22 Anterior Cervical Fusion.  Cervical immobilization. x 6-12 weeks  Prasad Landers MD. Neurosurgeon. [facility].   Outpatient follow up in 2 weeks.

## 2022-07-22 NOTE — ED PROVIDER NOTES
"ED Provider Note    CHIEF COMPLAINT  Chief Complaint   Patient presents with   • Trauma Green     Pt BIB EMS, hospital transfer from [facility]. Per report pt was fell from horse and trampled. Pt w/+helmet, -LOC. Pt report left side numbness/tingling, full ROM intact.        HPI  Savanah Alvarenga is a 22 y.o. female who presents as a transfer from [facility] following a fall from a horse earlier today.  She landed on the back of her head and had hyper flexion of the neck.  Was ambulatory after the fall.  No loss of consciousness.  She was helmeted.  She has primarily neck and upper back pain.    She was evaluated at the outside hospital and was found to have C6 teardrop fracture.  She notes left greater than right weakness.  She is able to lift her arms against gravity though has decreased  strength on her left side compared to right.  Has some slight numbness as well.  She states that she is ambidextrous though primarily writes with her left hand.    Pan CT imaging was performed at the outside hospital prior to transfer.  She was found to have a small pulmonary contusion though no other acute injuries were identified.    REVIEW OF SYSTEMS  See HPI for further details. All other systems are negative.     PAST MEDICAL HISTORY   has a past medical history of RSV (acute bronchiolitis due to respiratory syncytial virus).    SOCIAL HISTORY  Social History     Tobacco Use   • Smoking status: Passive Smoke Exposure - Never Smoker   • Smokeless tobacco: Not on file   Substance and Sexual Activity   • Alcohol use: No   • Drug use: No   • Sexual activity: Not on file       SURGICAL HISTORY   has a past surgical history that includes other.    CURRENT MEDICATIONS  Home Medications    **Home medications have not yet been reviewed for this encounter**         ALLERGIES  No Known Allergies    PHYSICAL EXAM  VITAL SIGNS: /75   Pulse 71   Temp 36.7 °C (98 °F)   Resp 16   Ht 1.803 m (5' 11\")   " Wt 81.6 kg (180 lb)   SpO2 99%   BMI 25.10 kg/m²   Pulse ox interpretation: I interpret this pulse ox as normal.  Constitutional: Alert in no apparent distress.  HENT: No signs of trauma, Bilateral external ears normal, Nose normal.   Eyes: Pupils are equal and reactive, Conjunctiva normal, Non-icteric.   Neck: Normal range of motion, diffuse tenderness, Supple, No stridor.  C-collar in place.  Cardiovascular: Regular rate and rhythm.   Thorax & Lungs: Normal breath sounds, No respiratory distress, No wheezing, No chest tenderness.   Abdomen: Bowel sounds normal, Soft, No tenderness, No masses, No pulsatile masses. No peritoneal signs.  Skin: Warm, Dry, No erythema, No rash.   Back: No bony tenderness, No CVA tenderness.   Extremities: Intact distal pulses, No edema, No tenderness, No cyanosis  Musculoskeletal: Good range of motion in all major joints. No major deformities noted.   Neurologic: Alert, Normal motor function and gait, slight numbness in bilateral upper extremities.  Slight weakness in her left upper extremity.  Has decreased  strength approximately 3 out of 5 in strength on the left with 5 out of 5 strength on the right though has subjective weakness on the right as well.  Is able to move her arms and lift them off of the bed without much struggling.      DIAGNOSTIC STUDIES / PROCEDURES      LABS  Labs Reviewed   COMP METABOLIC PANEL - Abnormal; Notable for the following components:       Result Value    Potassium 2.9 (*)     Co2 13 (*)     Anion Gap 19.0 (*)     Glucose 100 (*)     Total Bilirubin 2.1 (*)     All other components within normal limits   PROTHROMBIN TIME - Abnormal; Notable for the following components:    INR 1.14 (*)     All other components within normal limits   CBC WITHOUT DIFFERENTIAL - Abnormal; Notable for the following components:    WBC 16.4 (*)     MCHC 37.4 (*)     All other components within normal limits    Narrative:     SPECIMEN IS A RECOLLECT   POCT GLUCOSE DEVICE  RESULTS - Abnormal; Notable for the following components:    POC Glucose, Blood 104 (*)     All other components within normal limits   DIAGNOSTIC ALCOHOL   APTT   HCG QUAL SERUM   COD (ADULT)   ABO RH CONFIRM   ESTIMATED GFR   POCT GLUCOSE         RADIOLOGY  OUTSIDE IMAGES-DX CHEST   Final Result      OUTSIDE IMAGES-DX PELVIS   Final Result      OUTSIDE IMAGES-CT CHEST/ABDOMEN/PELVIS   Final Result      OUTSIDE IMAGES-CT THORACIC SPINE   Final Result      OUTSIDE IMAGES-CT LUMBAR SPINE   Final Result      OUTSIDE IMAGES-CT CERVICAL SPINE   Final Result      CT-FOREIGN FILM CAT SCAN   Final Result      OUTSIDE IMAGES-CT HEAD   Final Result      MR-CERVICAL SPINE-W/O    (Results Pending)         COURSE & MEDICAL DECISION MAKING    Medications   Respiratory Therapy Consult (has no administration in time range)   Pharmacy Consult Request ...Pain Management Review 1 Each (has no administration in time range)   docusate sodium (COLACE) capsule 100 mg (has no administration in time range)   senna-docusate (PERICOLACE or SENOKOT S) 8.6-50 MG per tablet 1 Tablet (1 Tablet Oral Refused 7/21/22 2100)   senna-docusate (PERICOLACE or SENOKOT S) 8.6-50 MG per tablet 1 Tablet (has no administration in time range)   polyethylene glycol/lytes (MIRALAX) PACKET 1 Packet (has no administration in time range)   magnesium hydroxide (MILK OF MAGNESIA) suspension 30 mL (has no administration in time range)   bisacodyl (DULCOLAX) suppository 10 mg (has no administration in time range)   sodium phosphate (Fleet) enema 133 mL (has no administration in time range)   NS infusion ( Intravenous New Bag 7/21/22 2034)   acetaminophen (TYLENOL) tablet 1,000 mg (1,000 mg Oral Given 7/21/22 2033)     Followed by   acetaminophen (TYLENOL) tablet 1,000 mg (has no administration in time range)   oxyCODONE immediate-release (ROXICODONE) tablet 5 mg ( Oral See Alternative 7/21/22 2147)     Or   oxyCODONE immediate release (ROXICODONE) tablet 10 mg ( Oral  See Alternative 7/21/22 2147)     Or   HYDROmorphone (Dilaudid) injection 0.5 mg (0.5 mg Intravenous Given 7/21/22 2147)   lidocaine (LIDODERM) 5 % 1 Patch (has no administration in time range)   gabapentin (NEURONTIN) capsule 100 mg (100 mg Oral Given 7/21/22 2111)   metaxalone (Skelaxin) tablet 800 mg (800 mg Oral Given 7/21/22 2033)   famotidine (PEPCID) tablet 20 mg (20 mg Enteral Tube Given 7/21/22 2034)     Or   famotidine (PEPCID) injection 20 mg ( Intravenous See Alternative 7/21/22 2034)   insulin regular (HumuLIN R,NovoLIN R) injection (0 Units Subcutaneous Dose not Required 7/21/22 1830)     And   dextrose 50% (D50W) injection 25 g (has no administration in time range)   prochlorperazine (COMPAZINE) injection 5-10 mg (5 mg Intravenous Given 7/21/22 2117)   LORazepam (ATIVAN) injection 0.5 mg (0.5 mg Intravenous Given 7/21/22 1920)       Pertinent Labs & Imaging studies reviewed. (See chart for details)  22 y.o. female presenting as a transfer from outside hospital after a fall from a horse.  She fell back and struck the back of her head on the ground.  She was helmeted though appeared to have a flexion injury of the neck.  Has a C6 teardrop fracture and some neurologic deficits including numbness and weakness especially to the left upper extremity.  She states that she is ambidextrous though predominantly left hand dominant.  No lower extremity weakness.  She was ambulatory after the event.  No difficulty with her breathing.  CTA of the neck was performed at the outside hospital as well which was unremarkable.    I did speak with Dr. Landers on call for spine surgery.  Recommending MRI without contrast for further evaluation.  We will determine the need for steroids after imaging and with continued monitoring.  Recommending [place] J collar as well.    Spoke with trauma surgery service and they are agreeable to hospitalization for further management.    The total critical care time on this patient is 35  "minutes, resuscitating patient, speaking with admitting physician, and deciphering test results. This 35 minutes is exclusive of separately billable procedures.      /78   Pulse 69   Temp 36.1 °C (96.9 °F) (Temporal)   Resp 14   Ht 1.803 m (5' 11\")   Wt 84.6 kg (186 lb 8.2 oz)   SpO2 96%   BMI 26.01 kg/m²       FINAL IMPRESSION  C6 fracture  Left arm numbness and weakness      Electronically signed by: Dejan Garrett M.D., 7/21/2022 5:51 PM    "

## 2022-07-22 NOTE — ED TRIAGE NOTES
Chief Complaint   Patient presents with   • Trauma Green     Pt BIB EMS, hospital transfer from [facility]. Per report pt was fell from horse and trampled. Pt w/+helmet, -LOC. Pt report left side numbness/tingling, full ROM intact.

## 2022-07-22 NOTE — CONSULTS
ID:  Savanah Alvarenga; 22 y.o. female      Admission Date: 7/21/2022   Date of Consultation: 7/21/2022  Requesting Provider: Mauricio Gaffney M.D.  PCP: PEDRO Scott        Chief Complaint:: fall from horse    Reason for consultation:: c6 fracture      HPI:  Savanah Alvarenga is a 22 y.o. female who presented to [facility] from [facility] for c6 fracture. Pt fell off a horse today. She landed on her head, fortunately she was helmeted. After accident, patient had severe numbness and tingling in hands and  strength weakness. This is improving. CT shows C6 fracture. Denies focal motor weakness or bowel/bladder issues.        Past Medical History:  Past Medical History:   Diagnosis Date   • Migraines    • RSV (acute bronchiolitis due to respiratory syncytial virus)     at 10 wks old       Past Surgical History:  Past Surgical History:   Procedure Laterality Date   • OTHER      phrenectomy       Social History:  Social History     Occupational History   • Not on file   Tobacco Use   • Smoking status: Never Smoker   • Smokeless tobacco: Never Used   Substance and Sexual Activity   • Alcohol use: No   • Drug use: Yes     Types: Inhaled     Comment: marijuana for migraines   • Sexual activity: Not on file     Social History     Social History Narrative   • Not on file       Family History:  History reviewed. No pertinent family history.    Medications:  Prior to Admission medications    Medication Sig Start Date End Date Taking? Authorizing Provider   sertraline (ZOLOFT) 100 MG Tab Take 100 mg by mouth every day.   Yes Physician Outpatient   Trimethobenzamide HCl (TIGAN PO) Take 1 Tablet by mouth 1 time a day as needed (nausea).   Yes Physician Outpatient       Allergies:  Allergies   Allergen Reactions   • Zofran [Ondansetron]        Review of Systems:    negative for constitutional, HEENT, cardiac, pulmonary, GI, , musculoskeletal, psych, dermatologic, endo, hematologic, immunologic  except: neck pain, upper extremity parasthesias    PHYSICAL  EXAMINATION:   A/O x 4, NAD, normal affect  Non-labored respiration, symmetrical chest rise  CN 2-12 grossly intact      Motor Exam (0-5/5, N/T)  STRENGTH R L   Deltoid  5 5   Biceps 5 5   Triceps 5 5   Wrist Dorsiflexors 5 5   Finger Abductor 5 5    5 4+   Iliopsoas 5 5   Quadriceps 5 5   Hamstrings 5 5   Ankle dorsiflexor 5 5   Ankle plantarflexor 5 5   Extensor hallucis 5 5       Dermatomal Deficit: Sensation intact throughout all dermatomes     REFLEXES R L   Biceps 2 2   Brachiorad. 2 2   Triceps 2 2   Patella 2 2   Ankle 2 2     Anal tone and sensation intact    No hoffmans  No clonus  Babinski downgoing      Muscle appearance: Symmetric and normal appearing bulk, contour and tone.    LABS:  Recent Labs     07/21/22  1747   SODIUM 141   POTASSIUM 2.9*   CHLORIDE 109   CO2 13*   GLUCOSE 100*   BUN 13   CREATININE 0.96   CALCIUM 8.9     Recent Labs     07/21/22  1932   WBC 16.4*   RBC 4.40   HEMOGLOBIN 14.1   HEMATOCRIT 37.7   MCV 85.7   MCH 32.0   MCHC 37.4*   RDW 38.8   PLATELETCT 274   MPV 9.6     Lab Results   Component Value Date/Time    PROTHROMBTM 14.4 07/21/2022 05:47 PM    INR 1.14 (H) 07/21/2022 05:47 PM      Recent Labs     07/21/22  1747   ASTSGOT 22   ALTSGPT 7   TBILIRUBIN 2.1*   ALKPHOSPHAT 67   GLOBULIN 2.5   INR 1.14*       Radiology Studies:     CT cervical spine shows tear drop fracture of C6 with posterior displacement    Impression and Plan:     Ms. Savanah Alvarenga is a 22 y.o. year old female presenting with c6 vertebral body fracture     - MRI of C Spine  - spine precautions  - NPO at midnight  - plan for OR tmrw    Thank you for the consultation. Please do not hesitate contacting me with questions.    Prasad Landers III, MD, PhD  Board eligible neurosurgeon  [facility]

## 2022-07-22 NOTE — ANESTHESIA TIME REPORT
Anesthesia Start and Stop Event Times     Date Time Event    7/22/2022 1126 Ready for Procedure     1157 Anesthesia Start     1621 Anesthesia Stop        Responsible Staff  07/22/22    Name Role Begin End    Jc Cia M.D. Anesth 1157 1621        Overtime Reason:  overtime    Comments:

## 2022-07-22 NOTE — PROGRESS NOTES
Neurosurgery Progress Note    Subjective:  22 yr old female presenting as a helmeted rider after a fall from a horse. Found to have C6 vertebral body fracture.   Initially presenting with severe b/l hand paresthesias and right hand weakness. This has improved overnight. She has minimal right hand paresthesias this morning.     Exam:  Pleasant and cooperative; in no acute distress.  Cervical collar present. HOB flat.   B/l upper/lower extremity motor strength 5/5  Sensation intact to upper extremities  No Hoffmans, no clonus      BP  Min: 108/55  Max: 140/62  Pulse  Av.7  Min: 52  Max: 90  Resp  Av.8  Min: 14  Max: 39  Temp  Av.4 °C (97.5 °F)  Min: 36.1 °C (96.9 °F)  Max: 36.7 °C (98 °F)  Monitored Temp 2  Av.8 °C (98.3 °F)  Min: 36.6 °C (97.88 °F)  Max: 37 °C (98.6 °F)  SpO2  Av.8 %  Min: 93 %  Max: 99 %    No data recorded    Recent Labs     22  19322  0525   WBC 16.4* 7.0   RBC 4.40 4.10*   HEMOGLOBIN 14.1 13.2   HEMATOCRIT 37.7 35.9*   MCV 85.7 87.6   MCH 32.0 32.2   MCHC 37.4* 36.8*   RDW 38.8 41.2   PLATELETCT 274 209   MPV 9.6 9.4     Recent Labs     227 22  0525   SODIUM 141 142   POTASSIUM 2.9* 3.9   CHLORIDE 109 112   CO2 13* 18*   GLUCOSE 100* 94   BUN 13 11   CREATININE 0.96 0.73   CALCIUM 8.9 8.5     Recent Labs     22   APTT 26.8   INR 1.14*           Intake/Output                       22 - 22 0659 22 - 22 0659     [id number] Total [id number] Total                 Intake    I.V.  0  943.3 943.3  200  -- 200    Pre-Hospital Volume 0 -- 0 -- -- --    Trauma Resuscitation Volume 0 -- 0 -- -- --    Volume (mL) (NS infusion) -- 943.3 943.3 200 -- 200    Blood  0  -- 0  --  -- --    PRBC Total Volume (Non-Barcoded) 0 -- 0 -- -- --    FFP Total Volume (Non-Barcoded) 0 -- 0 -- -- --    Platelets Total Volume (Non-Barcoded) 0 -- 0 -- -- --    Cryoprecipitate (Pooled) Total Volume  (Non-Barcoded) 0 -- 0 -- -- --    Total Intake 0 943.3 943.3 200 -- 200       Output    Urine  --  1275 1275  --  -- --    Output (mL) (Urethral Catheter Temperature probe) -- 1275 1275 -- -- --    Other  0  -- 0  --  -- --    Pre-Hospital Output 0 -- 0 -- -- --    Blood  0  -- 0  --  -- --    Est. Blood Loss 0 -- 0 -- -- --    Total Output 0 1275 1275 -- -- --       Net I/O     0 -331.7 -331.7 200 -- 200            Intake/Output Summary (Last 24 hours) at 7/22/2022 0924  Last data filed at 7/22/2022 0800  Gross per 24 hour   Intake 1143.33 ml   Output 1275 ml   Net -131.67 ml       $ Bladder Scan Results (mL): 733    • Respiratory Therapy Consult   Continuous RT   • Pharmacy Consult Request  1 Each PHARMACY TO DOSE   • docusate sodium  100 mg BID   • senna-docusate  1 Tablet Nightly   • senna-docusate  1 Tablet Q24HRS PRN   • polyethylene glycol/lytes  1 Packet BID   • magnesium hydroxide  30 mL DAILY   • bisacodyl  10 mg Q24HRS PRN   • sodium phosphate  1 Each Once PRN   • NS   Continuous   • acetaminophen  1,000 mg Q6HRS    Followed by   • [START ON 7/26/2022] acetaminophen  1,000 mg Q6HRS PRN   • oxyCODONE immediate-release  5 mg Q3HRS PRN    Or   • oxyCODONE immediate-release  10 mg Q3HRS PRN    Or   • HYDROmorphone  0.5 mg Q3HRS PRN   • lidocaine  1 Patch Q24HRS   • gabapentin  100 mg Q8HRS   • metaxalone  800 mg TID   • famotidine  20 mg BID    Or   • famotidine  20 mg BID   • insulin regular  1-6 Units Q6HRS    And   • dextrose bolus  25 g Q15 MIN PRN   • prochlorperazine  5-10 mg Q4HRS PRN       Assessment and Plan:  Hospital day #2  POD#0 C6 corpectomy   She is NPO for surgery today.  Continue with cervical collar and cervical precautions.  Hold lovenox until after surgery.      Chemical prophylactic DVT therapy: Yes - Lovenox 40mg/qd    Start date/time: after surgery

## 2022-07-22 NOTE — ANESTHESIA PROCEDURE NOTES
Airway    Date/Time: 7/22/2022 12:10 PM  Performed by: Jc Cai M.D.  Authorized by: Jc Cai M.D.     Location:  OR  Urgency:  Elective  Difficult Airway: No    Indications for Airway Management:  Anesthesia      Spontaneous Ventilation: absent    Sedation Level:  Deep  Preoxygenated: Yes    Patient Position:  Sniffing  Mask Difficulty Assessment:  1 - vent by mask  Final Airway Type:  Endotracheal airway  Final Endotracheal Airway:  ETT  Cuffed: Yes    Technique Used for Successful ETT Placement:  Direct laryngoscopy    Insertion Site:  Oral  Blade Type:  Glide  Laryngoscope Blade/Videolaryngoscope Blade Size:  3  ETT Size (mm):  7.0  Measured from:  Teeth  ETT to Teeth (cm):  22  Placement Verified by: auscultation and capnometry    Cormack-Lehane Classification:  Grade IIa - partial view of glottis  Number of Attempts at Approach:  1

## 2022-07-22 NOTE — PROGRESS NOTES
Patient to S118 via ACLS RN and CCT. Attached to ICU monitors. Vital signs as follows:    HR-- 78  BP--120/59  RR--24  SPO2--98 via RA  Temp--96.9F  Wt-- 84.6kg      4 Eyes Skin Assessment Completed by Elva RN and MISTY Lindsey.    Head WDL  Ears WDL  Nose WDL  Mouth WDL  Neck WDL  Breast/Chest WDL  Shoulder Blades WDL  Spine Redness and Bruising abrasions right flank  (R) Arm/Elbow/Hand Redness and Abrasion  (L) Arm/Elbow/Hand WDL  Abdomen WDL  Groin WDL  Scrotum/Coccyx/Buttocks Redness and Blanching  (R) Leg WDL  (L) Leg Redness and Abrasion  (R) Heel/Foot/Toe WDL  (L) Heel/Foot/Toe WDL          Devices In Places ECG, Pulse Ox and SCD's      Interventions In Place Sacral Mepilex, Pillows, Q2 Turns, Low Air Loss Mattress and Heels Loaded W/Pillows    Possible Skin Injury No    Pictures Uploaded Into Epic N/A  Wound Consult Placed N/A  RN Wound Prevention Protocol Ordered No       Belongings at bedside include:  --1 cell phone    All other belongings to go home with mom including clothing and jewelry

## 2022-07-22 NOTE — DISCHARGE PLANNING
SW attempted to complete assessment with pt at bedside, however, she was about to be transferred to the OR.

## 2022-07-22 NOTE — OR NURSING
Arrived to PACU in stable condition. Oral airway removed with no problems. O2 NC@2l. She is resting and hard to wake up.    1700 Dr Landers to beside and assessed patient. She is moving all extremities and is still very tired. Taking po well.

## 2022-07-22 NOTE — ED NOTES
Pt states that she is clostrophobic and will need medication for MRI. Reached out to Dr Landers for orders.

## 2022-07-22 NOTE — ASSESSMENT & PLAN NOTE
Prophylactic anticoagulation for thrombotic prevention initially contraindicated secondary to elevated bleeding risk.  7/23 Prophylactic dose enoxaparin initiated.  Cleared by neurosurgery.

## 2022-07-22 NOTE — CARE PLAN
The patient is Watcher - Medium risk of patient condition declining or worsening    Shift Goals  Clinical Goals: c-spine precautions, pain control  Patient Goals: pain & nausea control, rest  Family Goals: rest    Progress made toward(s) clinical / shift goals:    Problem: Knowledge Deficit - Standard  Goal: Patient and family/care givers will demonstrate understanding of plan of care, disease process/condition, diagnostic tests and medications  Outcome: Progressing     Problem: Pain - Standard  Goal: Alleviation of pain or a reduction in pain to the patient’s comfort goal  Outcome: Progressing     Problem: Skin Integrity  Goal: Skin integrity is maintained or improved  Outcome: Progressing     Problem: Fall Risk  Goal: Patient will remain free from falls  Outcome: Progressing     Problem: Neuro Status  Goal: Neuro status will remain stable or improve  Outcome: Progressing     Problem: Neurovascular Monitoring  Goal: Patient's neurovascular status will be maintained or improve  Outcome: Progressing       Patient is not progressing towards the following goals:

## 2022-07-22 NOTE — ANESTHESIA PREPROCEDURE EVALUATION
Case: [id number] Date/Time: 07/22/22 1045    Procedure: DISCECTOMY, SPINE, CERVICAL, ANTERIOR APPROACH, WITH FUSION- CORPECTOMY C6    Anesthesia type: General    Location: [place] [address] / SURGERY [facility]    Surgeons: Prasad Landers M.D.          Relevant Problems   NEURO   (positive) History of Chiari malformation       Physical Exam    Airway   Mallampati: II  TM distance: >3 FB  Neck ROM: limited       Cardiovascular - normal exam  Rhythm: regular  Rate: normal  (-) murmur     Dental - normal exam           Pulmonary - normal exam  Breath sounds clear to auscultation     Abdominal    Neurological - normal exam                 Anesthesia Plan    ASA 2       Plan - general       Airway plan will be ETT          Induction: intravenous    Postoperative Plan: Postoperative administration of opioids is intended.    Pertinent diagnostic labs and testing reviewed    Informed Consent:    Anesthetic plan and risks discussed with patient.    Use of blood products discussed with: patient whom consented to blood products.

## 2022-07-22 NOTE — PROGRESS NOTES
"      Mental status adequate for full examination?: Yes    Spine cleared (radiologically and/or clinically): No    REVIEW OF SYSTEMS:  Review of Systems   Constitutional: Positive for malaise/fatigue. Negative for chills and fever.   Respiratory: Negative for cough and shortness of breath.    Cardiovascular: Negative for chest pain.   Gastrointestinal: Negative for abdominal pain, constipation, nausea and vomiting.        BM PTA   Genitourinary: Positive for urgency. Negative for dysuria.   Musculoskeletal: Positive for myalgias and neck pain. Negative for joint pain.   Skin:        abrasion   Neurological: Positive for tingling. Negative for dizziness, sensory change, focal weakness, weakness and headaches.       PHYSICAL EXAMINATION:  /64   Pulse (!) 57   Temp 36.1 °C (96.9 °F) (Temporal)   Resp 15   Ht 1.803 m (5' 11\")   Wt 84.6 kg (186 lb 8.2 oz)   SpO2 95%   BMI 26.01 kg/m²   Physical Exam  Vitals and nursing note reviewed.   HENT:      Head: Normocephalic and atraumatic.      Mouth/Throat:      Mouth: Mucous membranes are moist.   Eyes:      Extraocular Movements: Extraocular movements intact.      Conjunctiva/sclera: Conjunctivae normal.   Neck:      Comments: C-collar in place  Cardiovascular:      Rate and Rhythm: Normal rate.   Pulmonary:      Effort: Pulmonary effort is normal. No respiratory distress.      Breath sounds: Normal breath sounds.   Abdominal:      Palpations: Abdomen is soft.      Tenderness: There is no abdominal tenderness. There is no guarding or rebound.   Musculoskeletal:      Comments: Moves all extremities   Skin:     Comments: Small abrasion right elbow   Neurological:      Mental Status: She is alert and oriented to person, place, and time.      GCS: GCS eye subscore is 4. GCS verbal subscore is 5. GCS motor subscore is 6.      Comments: strengths 5/5 BUE   Psychiatric:         Mood and Affect: Mood normal.         LABORATORY VALUES:  Recent Labs     07/21/22 1932 " 07/22/22  0525   WBC 16.4* 7.0   RBC 4.40 4.10*   HEMOGLOBIN 14.1 13.2   HEMATOCRIT 37.7 35.9*   MCV 85.7 87.6   MCH 32.0 32.2   MCHC 37.4* 36.8*   RDW 38.8 41.2   PLATELETCT 274 209   MPV 9.6 9.4     Recent Labs     07/21/22 1747 07/22/22  0525   SODIUM 141 142   POTASSIUM 2.9* 3.9   CHLORIDE 109 112   CO2 13* 18*   GLUCOSE 100* 94   BUN 13 11   CREATININE 0.96 0.73   CALCIUM 8.9 8.5     Recent Labs     07/21/22 1747 07/22/22  0525   ASTSGOT 22 17   ALTSGPT 7 5   TBILIRUBIN 2.1* 1.3   ALKPHOSPHAT 67 57   GLOBULIN 2.5 2.2   INR 1.14*  --      Recent Labs     07/21/22 1747   APTT 26.8   INR 1.14*       IMAGING:  MR-CERVICAL SPINE-W/O   Final Result      1.  There is mildly displaced fracture of anterior portion of C6 vertebral body. There is minimal retrolisthesis of C6 on 7. There is abnormal facet joint fluid at the levels of C6-7 likely representing distraction injury. There is no evidence of    subluxation or dislocation. There is increased T2 signal intensity at C6-7 interspinous spaces likely representing ligamentous injury. The flow voids of the vertebral arteries appear widely patent.   2.  There is mild central canal stenosis at C6-7.   3.  There is no spinal cord injury.      OUTSIDE IMAGES-DX CHEST   Final Result      OUTSIDE IMAGES-DX PELVIS   Final Result      OUTSIDE IMAGES-CT CHEST/ABDOMEN/PELVIS   Final Result      OUTSIDE IMAGES-CT THORACIC SPINE   Final Result      OUTSIDE IMAGES-CT LUMBAR SPINE   Final Result      OUTSIDE IMAGES-CT CERVICAL SPINE   Final Result      CT-FOREIGN FILM CAT SCAN   Final Result      OUTSIDE IMAGES-CT HEAD   Final Result      DX-CERVICAL SPINE-2 OR 3 VIEWS    (Results Pending)   DX-PORTABLE FLUORO > 1 HOUR    (Results Pending)       All current laboratory studies/radiology exams reviewed: Yes    Completed Consultations:  Dr. Prasad Landers, neurosurgery.      Pending Consultations:  None.    Newly Identified Diagnoses and Injuries:  None.     RAP Score Total:  4      CAGE Results: negative Blood Alcohol>0.08: no       Discussed patient condition with RN, Patient and trauma surgery. Dr. CORBY Kate.

## 2022-07-23 ENCOUNTER — APPOINTMENT (OUTPATIENT)
Dept: RADIOLOGY | Facility: MEDICAL CENTER | Age: 22
DRG: 472 | End: 2022-07-23
Payer: COMMERCIAL

## 2022-07-23 LAB
ALBUMIN SERPL BCP-MCNC: 4.1 G/DL (ref 3.2–4.9)
ALBUMIN/GLOB SERPL: 2.2 G/DL
ALP SERPL-CCNC: 55 U/L (ref 30–99)
ALT SERPL-CCNC: <5 U/L (ref 2–50)
ANION GAP SERPL CALC-SCNC: 14 MMOL/L (ref 7–16)
AST SERPL-CCNC: 17 U/L (ref 12–45)
BASOPHILS # BLD AUTO: 0.3 % (ref 0–1.8)
BASOPHILS # BLD: 0.03 K/UL (ref 0–0.12)
BILIRUB SERPL-MCNC: 1 MG/DL (ref 0.1–1.5)
BUN SERPL-MCNC: 9 MG/DL (ref 8–22)
CALCIUM SERPL-MCNC: 8.6 MG/DL (ref 8.5–10.5)
CHLORIDE SERPL-SCNC: 108 MMOL/L (ref 96–112)
CO2 SERPL-SCNC: 16 MMOL/L (ref 20–33)
CREAT SERPL-MCNC: 0.71 MG/DL (ref 0.5–1.4)
EOSINOPHIL # BLD AUTO: 0.01 K/UL (ref 0–0.51)
EOSINOPHIL NFR BLD: 0.1 % (ref 0–6.9)
ERYTHROCYTE [DISTWIDTH] IN BLOOD BY AUTOMATED COUNT: 41.8 FL (ref 35.9–50)
GFR SERPLBLD CREATININE-BSD FMLA CKD-EPI: 124 ML/MIN/1.73 M 2
GLOBULIN SER CALC-MCNC: 1.9 G/DL (ref 1.9–3.5)
GLUCOSE BLD STRIP.AUTO-MCNC: 84 MG/DL (ref 65–99)
GLUCOSE SERPL-MCNC: 93 MG/DL (ref 65–99)
HCT VFR BLD AUTO: 36.4 % (ref 37–47)
HGB BLD-MCNC: 13 G/DL (ref 12–16)
IMM GRANULOCYTES # BLD AUTO: 0.06 K/UL (ref 0–0.11)
IMM GRANULOCYTES NFR BLD AUTO: 0.5 % (ref 0–0.9)
LYMPHOCYTES # BLD AUTO: 1.47 K/UL (ref 1–4.8)
LYMPHOCYTES NFR BLD: 13.3 % (ref 22–41)
MCH RBC QN AUTO: 32.3 PG (ref 27–33)
MCHC RBC AUTO-ENTMCNC: 35.7 G/DL (ref 33.6–35)
MCV RBC AUTO: 90.5 FL (ref 81.4–97.8)
MONOCYTES # BLD AUTO: 0.86 K/UL (ref 0–0.85)
MONOCYTES NFR BLD AUTO: 7.8 % (ref 0–13.4)
NEUTROPHILS # BLD AUTO: 8.64 K/UL (ref 2–7.15)
NEUTROPHILS NFR BLD: 78 % (ref 44–72)
NRBC # BLD AUTO: 0 K/UL
NRBC BLD-RTO: 0 /100 WBC
PLATELET # BLD AUTO: 210 K/UL (ref 164–446)
PMV BLD AUTO: 9.3 FL (ref 9–12.9)
POTASSIUM SERPL-SCNC: 3.9 MMOL/L (ref 3.6–5.5)
PROT SERPL-MCNC: 6 G/DL (ref 6–8.2)
RBC # BLD AUTO: 4.02 M/UL (ref 4.2–5.4)
SODIUM SERPL-SCNC: 138 MMOL/L (ref 135–145)
WBC # BLD AUTO: 11.1 K/UL (ref 4.8–10.8)

## 2022-07-23 PROCEDURE — 700105 HCHG RX REV CODE 258

## 2022-07-23 PROCEDURE — 770001 HCHG ROOM/CARE - MED/SURG/GYN PRIV*

## 2022-07-23 PROCEDURE — 700111 HCHG RX REV CODE 636 W/ 250 OVERRIDE (IP): Performed by: SURGERY

## 2022-07-23 PROCEDURE — A9270 NON-COVERED ITEM OR SERVICE: HCPCS | Performed by: PHYSICIAN ASSISTANT

## 2022-07-23 PROCEDURE — 80053 COMPREHEN METABOLIC PANEL: CPT

## 2022-07-23 PROCEDURE — 72040 X-RAY EXAM NECK SPINE 2-3 VW: CPT

## 2022-07-23 PROCEDURE — 82962 GLUCOSE BLOOD TEST: CPT

## 2022-07-23 PROCEDURE — 85025 COMPLETE CBC W/AUTO DIFF WBC: CPT

## 2022-07-23 PROCEDURE — 700105 HCHG RX REV CODE 258: Performed by: PHYSICIAN ASSISTANT

## 2022-07-23 PROCEDURE — 700101 HCHG RX REV CODE 250: Performed by: PHYSICIAN ASSISTANT

## 2022-07-23 PROCEDURE — A9270 NON-COVERED ITEM OR SERVICE: HCPCS

## 2022-07-23 PROCEDURE — 700111 HCHG RX REV CODE 636 W/ 250 OVERRIDE (IP)

## 2022-07-23 PROCEDURE — 700101 HCHG RX REV CODE 250

## 2022-07-23 PROCEDURE — 700102 HCHG RX REV CODE 250 W/ 637 OVERRIDE(OP): Performed by: PHYSICIAN ASSISTANT

## 2022-07-23 PROCEDURE — 700102 HCHG RX REV CODE 250 W/ 637 OVERRIDE(OP)

## 2022-07-23 PROCEDURE — 99232 SBSQ HOSP IP/OBS MODERATE 35: CPT | Performed by: NURSE PRACTITIONER

## 2022-07-23 RX ORDER — LABETALOL HYDROCHLORIDE 5 MG/ML
10 INJECTION, SOLUTION INTRAVENOUS EVERY 4 HOURS PRN
Status: DISCONTINUED | OUTPATIENT
Start: 2022-07-23 | End: 2022-07-24 | Stop reason: HOSPADM

## 2022-07-23 RX ORDER — FAMOTIDINE 20 MG/1
20 TABLET, FILM COATED ORAL 2 TIMES DAILY
Status: DISCONTINUED | OUTPATIENT
Start: 2022-07-23 | End: 2022-07-23

## 2022-07-23 RX ADMIN — ACETAMINOPHEN 1000 MG: 500 TABLET, FILM COATED ORAL at 11:18

## 2022-07-23 RX ADMIN — BENZOCAINE AND MENTHOL 1 LOZENGE: 15; 3.6 LOZENGE ORAL at 02:25

## 2022-07-23 RX ADMIN — METAXALONE 800 MG: 800 TABLET ORAL at 11:18

## 2022-07-23 RX ADMIN — PROCHLORPERAZINE EDISYLATE 10 MG: 5 INJECTION INTRAMUSCULAR; INTRAVENOUS at 12:04

## 2022-07-23 RX ADMIN — OXYCODONE HYDROCHLORIDE 10 MG: 10 TABLET ORAL at 20:16

## 2022-07-23 RX ADMIN — SERTRALINE 100 MG: 100 TABLET, FILM COATED ORAL at 06:00

## 2022-07-23 RX ADMIN — CALCIUM CARBONATE 500 MG: 500 TABLET, CHEWABLE ORAL at 06:00

## 2022-07-23 RX ADMIN — GABAPENTIN 100 MG: 100 CAPSULE ORAL at 06:00

## 2022-07-23 RX ADMIN — WATER 2 G: 1000 INJECTION, SOLUTION INTRAVENOUS at 04:54

## 2022-07-23 RX ADMIN — GABAPENTIN 100 MG: 100 CAPSULE ORAL at 14:43

## 2022-07-23 RX ADMIN — METAXALONE 800 MG: 800 TABLET ORAL at 05:59

## 2022-07-23 RX ADMIN — DOCUSATE SODIUM 100 MG: 100 CAPSULE, LIQUID FILLED ORAL at 16:46

## 2022-07-23 RX ADMIN — PROCHLORPERAZINE EDISYLATE 10 MG: 5 INJECTION INTRAMUSCULAR; INTRAVENOUS at 07:27

## 2022-07-23 RX ADMIN — POLYETHYLENE GLYCOL 3350 1 PACKET: 17 POWDER, FOR SOLUTION ORAL at 16:46

## 2022-07-23 RX ADMIN — ACETAMINOPHEN 1000 MG: 500 TABLET, FILM COATED ORAL at 06:00

## 2022-07-23 RX ADMIN — OXYCODONE HYDROCHLORIDE 10 MG: 10 TABLET ORAL at 16:44

## 2022-07-23 RX ADMIN — DOCUSATE SODIUM 100 MG: 100 CAPSULE, LIQUID FILLED ORAL at 06:00

## 2022-07-23 RX ADMIN — SENNOSIDES AND DOCUSATE SODIUM 1 TABLET: 50; 8.6 TABLET ORAL at 20:15

## 2022-07-23 RX ADMIN — CALCIUM CARBONATE 500 MG: 500 TABLET, CHEWABLE ORAL at 16:46

## 2022-07-23 RX ADMIN — METAXALONE 800 MG: 800 TABLET ORAL at 16:46

## 2022-07-23 RX ADMIN — OXYCODONE HYDROCHLORIDE 10 MG: 10 TABLET ORAL at 02:30

## 2022-07-23 RX ADMIN — ENOXAPARIN SODIUM 40 MG: 40 INJECTION SUBCUTANEOUS at 16:48

## 2022-07-23 RX ADMIN — LIDOCAINE 1 PATCH: 50 PATCH TOPICAL at 06:02

## 2022-07-23 RX ADMIN — OXYCODONE HYDROCHLORIDE 10 MG: 10 TABLET ORAL at 07:27

## 2022-07-23 RX ADMIN — OXYCODONE HYDROCHLORIDE 10 MG: 10 TABLET ORAL at 11:18

## 2022-07-23 RX ADMIN — SODIUM CHLORIDE: 9 INJECTION, SOLUTION INTRAVENOUS at 01:31

## 2022-07-23 RX ADMIN — PROCHLORPERAZINE EDISYLATE 10 MG: 5 INJECTION INTRAMUSCULAR; INTRAVENOUS at 20:16

## 2022-07-23 ASSESSMENT — ENCOUNTER SYMPTOMS
WEAKNESS: 0
CHILLS: 0
FOCAL WEAKNESS: 0
MYALGIAS: 1
SENSORY CHANGE: 0
ABDOMINAL PAIN: 0
BACK PAIN: 0
TINGLING: 0
NAUSEA: 1
HEADACHES: 0
DOUBLE VISION: 0
FEVER: 0
NECK PAIN: 1
SHORTNESS OF BREATH: 0
COUGH: 0
ROS GI COMMENTS: BM PTA
DIZZINESS: 0
VOMITING: 0

## 2022-07-23 ASSESSMENT — PAIN DESCRIPTION - PAIN TYPE
TYPE: SURGICAL PAIN
TYPE: ACUTE PAIN;SURGICAL PAIN
TYPE: SURGICAL PAIN
TYPE: ACUTE PAIN;SURGICAL PAIN
TYPE: ACUTE PAIN;SURGICAL PAIN
TYPE: SURGICAL PAIN
TYPE: ACUTE PAIN;SURGICAL PAIN
TYPE: ACUTE PAIN;SURGICAL PAIN
TYPE: SURGICAL PAIN
TYPE: SURGICAL PAIN
TYPE: ACUTE PAIN;SURGICAL PAIN
TYPE: ACUTE PAIN;SURGICAL PAIN

## 2022-07-23 ASSESSMENT — FIBROSIS 4 INDEX: FIB4 SCORE: 0.76

## 2022-07-23 NOTE — PROGRESS NOTES
Trauma / Surgical Daily Progress Note    Date of Service  7/22/2022    Chief Complaint  22 y.o. female admitted 7/21/2022 with C6 fracture s/p trampled by horses    Interval Events    OR today for C6 fracture  ADAT    Review of Systems  Review of Systems   Musculoskeletal: Positive for neck pain.        Vital Signs for last 24 hours  Temp:  [35.9 °C (96.7 °F)-38.1 °C (100.5 °F)] 36.7 °C (98.1 °F)  Pulse:  [] 76  Resp:  [10-48] 21  BP: (108-140)/(53-66) 122/63  SpO2:  [91 %-98 %] 97 %    Hemodynamic parameters for last 24 hours       Respiratory Data     Respiration: (!) 21, Pulse Oximetry: 97 %     Work Of Breathing / Effort: Within Normal Limits  RUL Breath Sounds: Clear, RML Breath Sounds: Clear, RLL Breath Sounds: Clear, BARBRA Breath Sounds: Clear, LLL Breath Sounds: Clear    Physical Exam  Physical Exam  Vitals and nursing note reviewed.   HENT:      Head: Normocephalic.      Mouth/Throat:      Pharynx: Oropharynx is clear.   Eyes:      General: No scleral icterus.     Pupils: Pupils are equal, round, and reactive to light.   Neck:      Comments: Cervical collar in place  Cardiovascular:      Rate and Rhythm: Normal rate and regular rhythm.   Pulmonary:      Effort: Pulmonary effort is normal.   Musculoskeletal:         General: No swelling. Normal range of motion.   Skin:     General: Skin is warm and dry.      Capillary Refill: Capillary refill takes less than 2 seconds.   Neurological:      General: No focal deficit present.      Mental Status: She is alert and oriented to person, place, and time.         Laboratory  Recent Results (from the past 24 hour(s))   ABO Rh Confirm    Collection Time: 07/21/22  6:49 PM   Result Value Ref Range    ABO Rh Confirm O POS    CBC WITHOUT DIFFERENTIAL    Collection Time: 07/21/22  7:32 PM   Result Value Ref Range    WBC 16.4 (H) 4.8 - 10.8 K/uL    RBC 4.40 4.20 - 5.40 M/uL    Hemoglobin 14.1 12.0 - 16.0 g/dL    Hematocrit 37.7 37.0 - 47.0 %    MCV 85.7 81.4 - 97.8  fL    MCH 32.0 27.0 - 33.0 pg    MCHC 37.4 (H) 33.6 - 35.0 g/dL    RDW 38.8 35.9 - 50.0 fL    Platelet Count 274 164 - 446 K/uL    MPV 9.6 9.0 - 12.9 fL   POCT glucose device results    Collection Time: 07/21/22  8:38 PM   Result Value Ref Range    POC Glucose, Blood 104 (H) 65 - 99 mg/dL   POCT glucose device results    Collection Time: 07/22/22  5:24 AM   Result Value Ref Range    POC Glucose, Blood 89 65 - 99 mg/dL   CBC with Differential: Tomorrow AM    Collection Time: 07/22/22  5:25 AM   Result Value Ref Range    WBC 7.0 4.8 - 10.8 K/uL    RBC 4.10 (L) 4.20 - 5.40 M/uL    Hemoglobin 13.2 12.0 - 16.0 g/dL    Hematocrit 35.9 (L) 37.0 - 47.0 %    MCV 87.6 81.4 - 97.8 fL    MCH 32.2 27.0 - 33.0 pg    MCHC 36.8 (H) 33.6 - 35.0 g/dL    RDW 41.2 35.9 - 50.0 fL    Platelet Count 209 164 - 446 K/uL    MPV 9.4 9.0 - 12.9 fL    Neutrophils-Polys 71.20 44.00 - 72.00 %    Lymphocytes 17.40 (L) 22.00 - 41.00 %    Monocytes 9.90 0.00 - 13.40 %    Eosinophils 0.70 0.00 - 6.90 %    Basophils 0.40 0.00 - 1.80 %    Immature Granulocytes 0.40 0.00 - 0.90 %    Nucleated RBC 0.00 /100 WBC    Neutrophils (Absolute) 4.98 2.00 - 7.15 K/uL    Lymphs (Absolute) 1.22 1.00 - 4.80 K/uL    Monos (Absolute) 0.69 0.00 - 0.85 K/uL    Eos (Absolute) 0.05 0.00 - 0.51 K/uL    Baso (Absolute) 0.03 0.00 - 0.12 K/uL    Immature Granulocytes (abs) 0.03 0.00 - 0.11 K/uL    NRBC (Absolute) 0.00 K/uL   Comp Metabolic Panel (CMP): Tomorrow AM    Collection Time: 07/22/22  5:25 AM   Result Value Ref Range    Sodium 142 135 - 145 mmol/L    Potassium 3.9 3.6 - 5.5 mmol/L    Chloride 112 96 - 112 mmol/L    Co2 18 (L) 20 - 33 mmol/L    Anion Gap 12.0 7.0 - 16.0    Glucose 94 65 - 99 mg/dL    Bun 11 8 - 22 mg/dL    Creatinine 0.73 0.50 - 1.40 mg/dL    Calcium 8.5 8.5 - 10.5 mg/dL    AST(SGOT) 17 12 - 45 U/L    ALT(SGPT) 5 2 - 50 U/L    Alkaline Phosphatase 57 30 - 99 U/L    Total Bilirubin 1.3 0.1 - 1.5 mg/dL    Albumin 4.0 3.2 - 4.9 g/dL    Total Protein 6.2  6.0 - 8.2 g/dL    Globulin 2.2 1.9 - 3.5 g/dL    A-G Ratio 1.8 g/dL   ESTIMATED GFR    Collection Time: 07/22/22  5:25 AM   Result Value Ref Range    GFR (CKD-EPI) 120 >60 mL/min/1.73 m 2       Fluids    Intake/Output Summary (Last 24 hours) at 7/22/2022 1758  Last data filed at 7/22/2022 1620  Gross per 24 hour   Intake 3026.31 ml   Output 2075 ml   Net 951.31 ml       Core Measures & Quality Metrics  Core Measures & Quality Metrics  TEAGAN Score  ETOH Screening    Assessment/Plan  Closed displaced fracture of sixth cervical vertebra (HCC)- (present on admission)  Assessment & Plan  Referring facility imaging with unstable cervical injury with fracture of the anterior inferior C6 vertebral body, posterior translocation of the posterior fragment and widening of the C6-7 facet joints bilaterally compatible with disruption of the posterior discal ligament Tatian complex, posterior longitudinal ligament, and posterior interspinous/facet ligamentous complex.  By report, referring facility CTA negative.  Cervical MRI with increased T2 signal intensity at C6-7 interspinous spaces likely representing ligamentous injury. There is no spinal cord injury.  7/22 Plan for stabilization.   Cervical immobilization.  Prasad Landers MD. Neurosurgeon. [facility].    Depression- (present on admission)  Assessment & Plan  Chronic condition treated with Zoloft.  Definitive medication reconciliation pending.      No contraindication to deep vein thrombosis (DVT) prophylaxis- (present on admission)  Assessment & Plan  Prophylactic anticoagulation for thrombotic prevention initially contraindicated secondary to elevated bleeding risk.  7/23 Prophylactic dose enoxaparin initiated.  Cleared by neurosurgery.    Trauma- (present on admission)  Assessment & Plan  Helmeted, bucked off horse, then trampled on. No LOC. Immediate BUE paresthesias.  Trauma Green Transfer Activation from [facility].  Mauricio Gaffney MD. Trauma  Surgery.        Discussed patient condition with RN, RT, Pharmacy and neurosurgery.  CRITICAL CARE TIME EXCLUDING PROCEDURES: 35    minutes

## 2022-07-23 NOTE — PROGRESS NOTES
4 Eyes Skin Assessment Completed by MISTY Lindsey and MISTY Ferguson.     Head WDL  Ears WDL  Nose WDL  Mouth WDL  Neck incision, hemovac, cervical collar.   Breast/Chest WDL  Shoulder Blades scars, red blanching lines.   Spine Redness and Bruising abrasions right flank  (R) Arm/Elbow/Hand Redness and Abrasion  (L) Arm/Elbow/Hand WDL  Abdomen WDL  Groin WDL  Scrotum/Coccyx/Buttocks Redness and Blanching  (R) Leg WDL  (L) Leg Redness and Abrasion  (R) Heel/Foot/Toe redness blanching  (L) Heel/Foot/Toe redness blanching.      Devices In Places  Pulse Ox, Collar.         Interventions In Place Sacral Mepilex, Pillows, Q2 Turns,presure redistribution mattress, and Heels Loaded W/Pillows     Possible Skin Injury No     Pictures Uploaded Into Epic N/A  Wound Consult Placed N/A  RN Wound Prevention Protocol Ordered No

## 2022-07-23 NOTE — CARE PLAN
The patient is Stable - Low risk of patient condition declining or worsening    Shift Goals  Clinical Goals: Pain control; mobilize; IS usage; manage nausea  Patient Goals: rest, pain control  Family Goals: No family at bedside    Progress made toward(s) clinical / shift goals:    Problem: Knowledge Deficit - Standard  Goal: Patient and family/care givers will demonstrate understanding of plan of care, disease process/condition, diagnostic tests and medications  Outcome: Progressing     Problem: Pain - Standard  Goal: Alleviation of pain or a reduction in pain to the patient’s comfort goal  7/23/2022 0449 by Anel Alexander R.N.  Note: Educated on importance of pain management, utilizing pharm and non-pharm interventions to manage pain. Patient agreeable to trial oxycodone in favor of IV medications for pain management related to nausea. Patient tolerating PO oxycodone well.  7/23/2022 0449 by Anel Alexander R.N.  Outcome: Progressing     Problem: Skin Integrity  Goal: Skin integrity is maintained or improved  Outcome: Progressing     Problem: Fall Risk  Goal: Patient will remain free from falls  Outcome: Progressing     Problem: Cervical Spine Surgery  Goal: Post-Operative Cervical Spine Surgery: Patient will achieve optimal post-surgical outcomes  7/23/2022 0449 by Anel Alexander RKeeshaN.  Note: Patient updated on plan for standing cervical scans in the morning. Patient agreeable to plan of care, waiting for xray tech to call for availability for scan. Per tech, no techs available at night in machine.  7/23/2022 0449 by Anel Alexander R.N.  Outcome: Progressing     Problem: Neuro Status  Goal: Neuro status will remain stable or improve  Outcome: Progressing     Problem: Neurovascular Monitoring  Goal: Patient's neurovascular status will be maintained or improve  Outcome: Progressing     Problem: Respiratory  Goal: Patient will achieve/maintain optimum respiratory ventilation and gas  exchange  Outcome: Progressing     Problem: Mobility  Goal: Patient's capacity to carry out activities will improve  Outcome: Progressing  Note: Patient demonstrating improved mobility with standby assistance to get up to commode.       Patient is not progressing towards the following goals:

## 2022-07-23 NOTE — PROGRESS NOTES
Neurosurgery Progress Note    Subjective:  22 yr old female presenting as a helmeted rider after a fall from a horse. Found to have C6 vertebral body fracture.   Initially presenting with severe b/l hand paresthesias and right hand weakness.     POD#1 C6 corpectomy  Patient improved  Tolerable neck pain  Previous arm paresthesia now resoleved, no radicular pain  Drain at 20cc overnight    Has baseline nausea   Had suboccipital craniotomy for Chiari several years ago at the [facility]     Exam:  Pleasant and cooperative; in no acute distress.  Cervical collar on  Voice is strong  Incision is intact and soft   Upper motor strength 5/5  Sensation intact to upper extremities  No Hoffmans, no clonus      BP  Min: 100/50  Max: 156/66  Pulse  Av.8  Min: 50  Max: 111  Resp  Av.7  Min: 12  Max: 48  Temp  Av.9 °C (98.4 °F)  Min: 35.9 °C (96.7 °F)  Max: 38.1 °C (100.5 °F)  Monitored Temp 2  Av °C (98.6 °F)  Min: 36.5 °C (97.7 °F)  Max: 37.4 °C (99.32 °F)  SpO2  Av.9 %  Min: 90 %  Max: 97 %    No data recorded    Recent Labs     22  0525 22  0619   WBC 16.4* 7.0 11.1*   RBC 4.40 4.10* 4.02*   HEMOGLOBIN 14.1 13.2 13.0   HEMATOCRIT 37.7 35.9* 36.4*   MCV 85.7 87.6 90.5   MCH 32.0 32.2 32.3   MCHC 37.4* 36.8* 35.7*   RDW 38.8 41.2 41.8   PLATELETCT 274 209 210   MPV 9.6 9.4 9.3     Recent Labs     22  0525 22  0619   SODIUM 141 142 138   POTASSIUM 2.9* 3.9 3.9   CHLORIDE 109 112 108   CO2 13* 18* 16*   GLUCOSE 100* 94 93   BUN 13 11 9   CREATININE 0.96 0.73 0.71   CALCIUM 8.9 8.5 8.6     Recent Labs     22   APTT 26.8   INR 1.14*           Intake/Output                       22 0700 - 22 0659 22 07 - 22 0659      Total  Total                 Intake    P.O.  --  260 260  --  -- --    P.O. -- 260 260 -- -- --    I.V.  9846 4958 2524  --  -- --    Phenylephrine Volume 2 -- 2 -- -- --     Propofol Volume 171 -- 171 -- -- --    Morphine Volume 10 -- 10 -- -- --    Volume (mL) (NS infusion) 1200 1200 2400 -- -- --    Volume (mL) (Lactated Ringers) 1500 -- 1500 -- -- --    IV Piggyback  --  200 200  --  -- --    Volume (mL) (ceFAZolin in dextrose (Ancef) IVPB premix 2 g) -- 200 200 -- -- --    Total Intake 2883 1660 4543 -- -- --       Output    Urine  950  575 1525  --  -- --    Urine 500 -- 500 -- -- --    Number of Times Voided -- 2 x 2 x -- -- --    Urine Void (mL) -- 150 150 -- -- --    Output (mL) ([REMOVED] Urethral Catheter Temperature probe) 450 425 875 -- -- --    Drains  --  20 20  --  -- --    Output (mL) (Closed/Suction Drain Neck Hemovac) -- 20 20 -- -- --    Stool  --  -- --  --  -- --    Number of Times Stooled -- 0 x 0 x -- -- --    Blood  200  -- 200  --  -- --    Est. Blood Loss 200 -- 200 -- -- --    Total Output 4875 719 9746 -- -- --       Net I/O     1733 1065 2798 -- -- --            Intake/Output Summary (Last 24 hours) at 7/23/2022 0749  Last data filed at 7/23/2022 0600  Gross per 24 hour   Intake 4542.98 ml   Output 1745 ml   Net 2797.98 ml            • famotidine  20 mg BID    Or   • famotidine  20 mg BID   • enoxaparin (LOVENOX) injection  40 mg DAILY AT 1800   • sertraline  100 mg DAILY   • Pharmacy Consult Request  1 Each PHARMACY TO DOSE   • MD ALERT...DO NOT ADMINISTER NSAIDS or ASPIRIN unless ORDERED By Neurosurgery  1 Each PRN   • docusate sodium  100 mg BID   • senna-docusate  1 Tablet Nightly   • senna-docusate  1 Tablet Q24HRS PRN   • polyethylene glycol/lytes  1 Packet BID PRN   • magnesium hydroxide  30 mL QDAY PRN   • bisacodyl  10 mg Q24HRS PRN   • sodium phosphate  1 Each Once PRN   • ceFAZolin  2 g Q8HR   • diphenhydrAMINE  25 mg Q6HRS PRN    Or   • diphenhydrAMINE  25 mg Q6HRS PRN   • promethazine  12.5-25 mg Q4HRS PRN   • promethazine  12.5-25 mg Q4HRS PRN   • prochlorperazine  5-10 mg Q4HRS PRN   • methocarbamol  750 mg Q8HRS PRN   • labetalol  10 mg Q  HOUR PRN   • benzocaine-menthol  1 Lozenge Q2HRS PRN   • calcium carbonate  500 mg BID   • Respiratory Therapy Consult   Continuous RT   • polyethylene glycol/lytes  1 Packet BID   • magnesium hydroxide  30 mL DAILY   • sodium phosphate  1 Each Once PRN   • NS   Continuous   • acetaminophen  1,000 mg Q6HRS    Followed by   • [START ON 7/26/2022] acetaminophen  1,000 mg Q6HRS PRN   • oxyCODONE immediate-release  5 mg Q3HRS PRN    Or   • oxyCODONE immediate-release  10 mg Q3HRS PRN    Or   • HYDROmorphone  0.5 mg Q3HRS PRN   • lidocaine  1 Patch Q24HRS   • gabapentin  100 mg Q8HRS   • metaxalone  800 mg TID       Assessment and Plan:  Hospital day #3  POD#1 C6 corpectomy   Keep drain in  Mobilize  PT/OT  Pending upright cervical x-rays  Collar on for 6-12 weeks   Okay to tsf to floor from our standpoint       Chemical prophylactic DVT therapy: Yes - Lovenox 40mg/qd    Start date/time: 7/23

## 2022-07-23 NOTE — PROGRESS NOTES
1725: Patient arrived to w118 on monitor accompanied by ACLS RN and PACU RN     Patient's vital signs on admit:     · HR: 97  · BP: 133/60  · RR: 18  · SaO2: 94% on room air  · Temp: 99.3 F  ___________________________________________________________________________     4 Eyes Skin Assessment Completed by James, RN and Shaylee RN.    • Head WDL  • Ears WDL  • Nose WDL  • Mouth WDL  • Neck ACDF incision, well approximated with dermabond,  • hemovac to neck under tegaderm and gauze, dressing clean/dry/intact  • Breast/Chest WDL  • Shoulder Blades scratches  • Spine WDL  • (R) Arm/Elbow/Hand right elbow scab  • (L) Arm/Elbow/Hand WDL  • Abdomen WDL  • Groin WDL  • Scrotum/Coccyx/Buttocks WDL  • (R) Leg WDL  • (L) Leg WDL  • (R) Heel/Foot/Toe WDL  • (L) Heel/Foot/Toe WDL    • Devices In Places ECG, Blood Pressure Cuff, Pulse Ox, [place], SCD's and Cervical Collar    • Interventions In Place Sacral Mepilex, Pillows, Q2 Turns and Low Air Loss Mattress    • Possible Skin Injury No    • Pictures Uploaded Into Epic N/A  • Wound Consult Placed N/A  • RN Wound Prevention Protocol Ordered No   ___________________________________________________________________________

## 2022-07-23 NOTE — PROGRESS NOTES
Trauma / Surgical Daily Progress Note    Date of Service  7/23/2022    Chief Complaint  22 y.o. female admitted 7/21/2022 with C6 fracture s/p trampled by horses    POD #1 C6 corpectomy    Interval Events  Paraesthesias improved  Pain control adequate  Tolerating PO  Hemovac with 20ml output  Neurosurgical recommendations reviewed    - Therapy evaluations pending  - Transfer to [facility]    Review of Systems  Review of Systems   Constitutional: Positive for malaise/fatigue. Negative for chills and fever.   Eyes: Negative for double vision.   Respiratory: Negative for cough and shortness of breath.    Cardiovascular: Negative for chest pain.   Gastrointestinal: Positive for nausea (intermittent, chronic). Negative for abdominal pain and vomiting.        BM PTA    Genitourinary:        Voiding   Musculoskeletal: Positive for myalgias and neck pain. Negative for back pain and joint pain.   Neurological: Negative for dizziness, tingling, sensory change, focal weakness, weakness and headaches.        Vital Signs  Temp:  [35.9 °C (96.7 °F)-38.1 °C (100.5 °F)] 37 °C (98.6 °F)  Pulse:  [] 87  Resp:  [12-40] 19  BP: (100-156)/(50-77) 144/60  SpO2:  [90 %-97 %] 91 %    Physical Exam  Physical Exam  Vitals and nursing note reviewed.   Constitutional:       General: She is not in acute distress.     Appearance: She is not toxic-appearing.   HENT:      Head: Normocephalic.      Right Ear: External ear normal.      Left Ear: External ear normal.      Nose: Nose normal.      Mouth/Throat:      Mouth: Mucous membranes are moist.      Pharynx: Oropharynx is clear.   Eyes:      Conjunctiva/sclera: Conjunctivae normal.   Neck:      Comments: Cervical collar in place, anterior surgical incision approximated with surgical glue, hemovac in place  Cardiovascular:      Rate and Rhythm: Normal rate.      Pulses: Normal pulses.   Pulmonary:      Effort: Pulmonary effort is normal. No respiratory distress.   Chest:      Chest wall: No  tenderness.   Abdominal:      General: There is no distension.      Palpations: Abdomen is soft.      Tenderness: There is no abdominal tenderness. There is no guarding.   Musculoskeletal:      Comments: Moves all extremities    Skin:     General: Skin is warm and dry.      Capillary Refill: Capillary refill takes less than 2 seconds.   Neurological:      Mental Status: She is alert and oriented to person, place, and time.         Laboratory  Recent Results (from the past 24 hour(s))   POCT glucose device results    Collection Time: 07/22/22  5:34 PM   Result Value Ref Range    POC Glucose, Blood 109 (H) 65 - 99 mg/dL   POCT glucose device results    Collection Time: 07/22/22  8:22 PM   Result Value Ref Range    POC Glucose, Blood 94 65 - 99 mg/dL   POCT glucose device results    Collection Time: 07/22/22 11:35 PM   Result Value Ref Range    POC Glucose, Blood 96 65 - 99 mg/dL   CBC with Differential: Tomorrow AM    Collection Time: 07/23/22  6:19 AM   Result Value Ref Range    WBC 11.1 (H) 4.8 - 10.8 K/uL    RBC 4.02 (L) 4.20 - 5.40 M/uL    Hemoglobin 13.0 12.0 - 16.0 g/dL    Hematocrit 36.4 (L) 37.0 - 47.0 %    MCV 90.5 81.4 - 97.8 fL    MCH 32.3 27.0 - 33.0 pg    MCHC 35.7 (H) 33.6 - 35.0 g/dL    RDW 41.8 35.9 - 50.0 fL    Platelet Count 210 164 - 446 K/uL    MPV 9.3 9.0 - 12.9 fL    Neutrophils-Polys 78.00 (H) 44.00 - 72.00 %    Lymphocytes 13.30 (L) 22.00 - 41.00 %    Monocytes 7.80 0.00 - 13.40 %    Eosinophils 0.10 0.00 - 6.90 %    Basophils 0.30 0.00 - 1.80 %    Immature Granulocytes 0.50 0.00 - 0.90 %    Nucleated RBC 0.00 /100 WBC    Neutrophils (Absolute) 8.64 (H) 2.00 - 7.15 K/uL    Lymphs (Absolute) 1.47 1.00 - 4.80 K/uL    Monos (Absolute) 0.86 (H) 0.00 - 0.85 K/uL    Eos (Absolute) 0.01 0.00 - 0.51 K/uL    Baso (Absolute) 0.03 0.00 - 0.12 K/uL    Immature Granulocytes (abs) 0.06 0.00 - 0.11 K/uL    NRBC (Absolute) 0.00 K/uL   Comp Metabolic Panel (CMP): Tomorrow AM    Collection Time: 07/23/22  6:19  AM   Result Value Ref Range    Sodium 138 135 - 145 mmol/L    Potassium 3.9 3.6 - 5.5 mmol/L    Chloride 108 96 - 112 mmol/L    Co2 16 (L) 20 - 33 mmol/L    Anion Gap 14.0 7.0 - 16.0    Glucose 93 65 - 99 mg/dL    Bun 9 8 - 22 mg/dL    Creatinine 0.71 0.50 - 1.40 mg/dL    Calcium 8.6 8.5 - 10.5 mg/dL    AST(SGOT) 17 12 - 45 U/L    ALT(SGPT) <5 2 - 50 U/L    Alkaline Phosphatase 55 30 - 99 U/L    Total Bilirubin 1.0 0.1 - 1.5 mg/dL    Albumin 4.1 3.2 - 4.9 g/dL    Total Protein 6.0 6.0 - 8.2 g/dL    Globulin 1.9 1.9 - 3.5 g/dL    A-G Ratio 2.2 g/dL   POCT glucose device results    Collection Time: 07/23/22  6:19 AM   Result Value Ref Range    POC Glucose, Blood 84 65 - 99 mg/dL   ESTIMATED GFR    Collection Time: 07/23/22  6:19 AM   Result Value Ref Range    GFR (CKD-EPI) 124 >60 mL/min/1.73 m 2       Fluids    Intake/Output Summary (Last 24 hours) at 7/23/2022 0938  Last data filed at 7/23/2022 0800  Gross per 24 hour   Intake 4782.98 ml   Output 1695 ml   Net 3087.98 ml       Core Measures & Quality Metrics  Labs reviewed, Medications reviewed and Radiology images reviewed  Calhoun catheter: No Calhoun      DVT Prophylaxis: Enoxaparin (Lovenox)  DVT prophylaxis - mechanical: SCDs  Ulcer prophylaxis: Not indicated        RAP Score Total: 4    CAGE Results: negative Blood Alcohol>0.08: no       Assessment/Plan  Closed displaced fracture of sixth cervical vertebra (HCC)- (present on admission)  Assessment & Plan  Referring facility imaging with unstable cervical injury with fracture of the anterior inferior C6 vertebral body, posterior translocation of the posterior fragment and widening of the C6-7 facet joints bilaterally compatible with disruption of the posterior discal ligament Tatian complex, posterior longitudinal ligament, and posterior interspinous/facet ligamentous complex.  By report, referring facility CTA negative.  Cervical MRI with increased T2 signal intensity at C6-7 interspinous spaces likely  representing ligamentous injury. There is no spinal cord injury.  7/22 OR for stabilization.  Hemovac in place  Cervical immobilization. x 6-12 weeks  Upright cervical x-rays pending  Prasad Landers MD. Neurosurgeon. [facility].    Depression- (present on admission)  Assessment & Plan  Chronic condition treated with Zoloft.  7/23 Resumed maintenance medication.     No contraindication to deep vein thrombosis (DVT) prophylaxis- (present on admission)  Assessment & Plan  Prophylactic anticoagulation for thrombotic prevention initially contraindicated secondary to elevated bleeding risk.  7/23 Prophylactic dose enoxaparin initiated.  Cleared by neurosurgery.    Trauma- (present on admission)  Assessment & Plan  Helmeted, bucked off horse, then trampled on.   No LOC. Immediate BUE paresthesias.  Trauma Green Transfer Activation from [facility].  Mauricio Gaffney MD. Trauma Surgery.        Discussed patient condition with Family, RN, Patient and trauma surgery, Dr. CORBY Kate.

## 2022-07-23 NOTE — PROGRESS NOTES
Transferred to unit.  Ambulatory no DME. Aox4.  Neurocheck unchanged, refused SCDS despite education aware will start lovenox tonight.  Hemovac to compression.  Neck incision closed with dermabond.  Hard cervical collar in place, adjusted for comfort/support. Medicated for pain, subsequent nausea relief with prn medication. Family at bedside.

## 2022-07-23 NOTE — PROGRESS NOTES
Discussed cervical xray with xray techs at bedside, per techs they will be available to do the scan after completing bedside scans. Xray tech will call RN when ready for patient. Patient updated on plan, agreeable.

## 2022-07-23 NOTE — CARE PLAN
The patient is Stable - Low risk of patient condition declining or worsening    Shift Goals  Clinical Goals: pain control, mobilize, manage nausea  Patient Goals: rest, pain control  Family Goals: No family at bedside    Progress made toward(s) clinical / shift goals:    Problem: Knowledge Deficit - Standard  Goal: Patient and family/care givers will demonstrate understanding of plan of care, disease process/condition, diagnostic tests and medications  Outcome: Progressing     Problem: Pain - Standard  Goal: Alleviation of pain or a reduction in pain to the patient’s comfort goal  Outcome: Progressing     Problem: Skin Integrity  Goal: Skin integrity is maintained or improved  Outcome: Progressing     Problem: Fall Risk  Goal: Patient will remain free from falls  Outcome: Progressing     Problem: Cervical Spine Surgery  Goal: Post-Operative Cervical Spine Surgery: Patient will achieve optimal post-surgical outcomes  Outcome: Progressing     Problem: Neuro Status  Goal: Neuro status will remain stable or improve  Outcome: Progressing     Problem: Neurovascular Monitoring  Goal: Patient's neurovascular status will be maintained or improve  Outcome: Progressing     Problem: Respiratory  Goal: Patient will achieve/maintain optimum respiratory ventilation and gas exchange  Outcome: Progressing     Problem: Mobility  Goal: Patient's capacity to carry out activities will improve  Outcome: Progressing       Patient is not progressing towards the following goals:

## 2022-07-24 VITALS
TEMPERATURE: 97.2 F | HEIGHT: 71 IN | DIASTOLIC BLOOD PRESSURE: 55 MMHG | OXYGEN SATURATION: 95 % | RESPIRATION RATE: 16 BRPM | SYSTOLIC BLOOD PRESSURE: 117 MMHG | BODY MASS INDEX: 26.27 KG/M2 | HEART RATE: 76 BPM | WEIGHT: 187.61 LBS

## 2022-07-24 LAB
ALBUMIN SERPL BCP-MCNC: 3.9 G/DL (ref 3.2–4.9)
ALBUMIN/GLOB SERPL: 1.9 G/DL
ALP SERPL-CCNC: 52 U/L (ref 30–99)
ALT SERPL-CCNC: <5 U/L (ref 2–50)
ANION GAP SERPL CALC-SCNC: 14 MMOL/L (ref 7–16)
AST SERPL-CCNC: 14 U/L (ref 12–45)
BASOPHILS # BLD AUTO: 0.4 % (ref 0–1.8)
BASOPHILS # BLD: 0.03 K/UL (ref 0–0.12)
BILIRUB SERPL-MCNC: 0.9 MG/DL (ref 0.1–1.5)
BUN SERPL-MCNC: 7 MG/DL (ref 8–22)
CALCIUM SERPL-MCNC: 8.9 MG/DL (ref 8.5–10.5)
CHLORIDE SERPL-SCNC: 107 MMOL/L (ref 96–112)
CO2 SERPL-SCNC: 19 MMOL/L (ref 20–33)
CREAT SERPL-MCNC: 0.71 MG/DL (ref 0.5–1.4)
EOSINOPHIL # BLD AUTO: 0.07 K/UL (ref 0–0.51)
EOSINOPHIL NFR BLD: 1 % (ref 0–6.9)
ERYTHROCYTE [DISTWIDTH] IN BLOOD BY AUTOMATED COUNT: 41.5 FL (ref 35.9–50)
GFR SERPLBLD CREATININE-BSD FMLA CKD-EPI: 124 ML/MIN/1.73 M 2
GLOBULIN SER CALC-MCNC: 2.1 G/DL (ref 1.9–3.5)
GLUCOSE SERPL-MCNC: 78 MG/DL (ref 65–99)
HCT VFR BLD AUTO: 34.6 % (ref 37–47)
HGB BLD-MCNC: 12.3 G/DL (ref 12–16)
IMM GRANULOCYTES # BLD AUTO: 0.02 K/UL (ref 0–0.11)
IMM GRANULOCYTES NFR BLD AUTO: 0.3 % (ref 0–0.9)
LYMPHOCYTES # BLD AUTO: 1.81 K/UL (ref 1–4.8)
LYMPHOCYTES NFR BLD: 25.7 % (ref 22–41)
MCH RBC QN AUTO: 32.2 PG (ref 27–33)
MCHC RBC AUTO-ENTMCNC: 35.5 G/DL (ref 33.6–35)
MCV RBC AUTO: 90.6 FL (ref 81.4–97.8)
MONOCYTES # BLD AUTO: 0.71 K/UL (ref 0–0.85)
MONOCYTES NFR BLD AUTO: 10.1 % (ref 0–13.4)
NEUTROPHILS # BLD AUTO: 4.39 K/UL (ref 2–7.15)
NEUTROPHILS NFR BLD: 62.5 % (ref 44–72)
NRBC # BLD AUTO: 0 K/UL
NRBC BLD-RTO: 0 /100 WBC
PLATELET # BLD AUTO: 191 K/UL (ref 164–446)
PMV BLD AUTO: 9.5 FL (ref 9–12.9)
POTASSIUM SERPL-SCNC: 3.8 MMOL/L (ref 3.6–5.5)
PROT SERPL-MCNC: 6 G/DL (ref 6–8.2)
RBC # BLD AUTO: 3.82 M/UL (ref 4.2–5.4)
SODIUM SERPL-SCNC: 140 MMOL/L (ref 135–145)
WBC # BLD AUTO: 7 K/UL (ref 4.8–10.8)

## 2022-07-24 PROCEDURE — 80053 COMPREHEN METABOLIC PANEL: CPT

## 2022-07-24 PROCEDURE — 700101 HCHG RX REV CODE 250: Performed by: PHYSICIAN ASSISTANT

## 2022-07-24 PROCEDURE — 97161 PT EVAL LOW COMPLEX 20 MIN: CPT

## 2022-07-24 PROCEDURE — 85025 COMPLETE CBC W/AUTO DIFF WBC: CPT

## 2022-07-24 PROCEDURE — 99239 HOSP IP/OBS DSCHRG MGMT >30: CPT

## 2022-07-24 PROCEDURE — A9270 NON-COVERED ITEM OR SERVICE: HCPCS | Performed by: PHYSICIAN ASSISTANT

## 2022-07-24 PROCEDURE — 97165 OT EVAL LOW COMPLEX 30 MIN: CPT

## 2022-07-24 PROCEDURE — 700102 HCHG RX REV CODE 250 W/ 637 OVERRIDE(OP): Performed by: PHYSICIAN ASSISTANT

## 2022-07-24 PROCEDURE — 36415 COLL VENOUS BLD VENIPUNCTURE: CPT

## 2022-07-24 PROCEDURE — 700111 HCHG RX REV CODE 636 W/ 250 OVERRIDE (IP): Performed by: PHYSICIAN ASSISTANT

## 2022-07-24 PROCEDURE — 700102 HCHG RX REV CODE 250 W/ 637 OVERRIDE(OP)

## 2022-07-24 PROCEDURE — A9270 NON-COVERED ITEM OR SERVICE: HCPCS

## 2022-07-24 PROCEDURE — 700111 HCHG RX REV CODE 636 W/ 250 OVERRIDE (IP)

## 2022-07-24 RX ORDER — PROMETHAZINE HYDROCHLORIDE 12.5 MG/1
12.5 TABLET ORAL EVERY 4 HOURS PRN
Qty: 30 TABLET | Refills: 0 | Status: SHIPPED | OUTPATIENT
Start: 2022-07-24 | End: 2022-07-31

## 2022-07-24 RX ORDER — GABAPENTIN 100 MG/1
100 CAPSULE ORAL 3 TIMES DAILY
Qty: 21 CAPSULE | Refills: 0 | Status: SHIPPED | OUTPATIENT
Start: 2022-07-24 | End: 2022-07-31

## 2022-07-24 RX ORDER — POLYETHYLENE GLYCOL 3350 17 G/17G
17 POWDER, FOR SOLUTION ORAL 2 TIMES DAILY PRN
Refills: 3 | COMMUNITY
Start: 2022-07-24

## 2022-07-24 RX ORDER — METAXALONE 800 MG/1
800 TABLET ORAL 3 TIMES DAILY
Qty: 21 TABLET | Refills: 0 | Status: SHIPPED | OUTPATIENT
Start: 2022-07-24 | End: 2022-07-31

## 2022-07-24 RX ORDER — OXYCODONE AND ACETAMINOPHEN 5; 325 MG/1; MG/1
1-2 TABLET ORAL EVERY 4 HOURS PRN
Qty: 35 TABLET | Refills: 0 | Status: SHIPPED | OUTPATIENT
Start: 2022-07-24 | End: 2022-07-31

## 2022-07-24 RX ORDER — AMOXICILLIN 250 MG
1 CAPSULE ORAL
Qty: 30 TABLET | Refills: 0 | Status: SHIPPED | OUTPATIENT
Start: 2022-07-24

## 2022-07-24 RX ORDER — OXYCODONE AND ACETAMINOPHEN 5; 325 MG/1; MG/1
1-2 TABLET ORAL EVERY 4 HOURS PRN
Qty: 35 TABLET | Refills: 0 | Status: SHIPPED | OUTPATIENT
Start: 2022-07-24 | End: 2022-07-24 | Stop reason: SDUPTHER

## 2022-07-24 RX ORDER — PSEUDOEPHEDRINE HCL 30 MG
100 TABLET ORAL 2 TIMES DAILY
Qty: 60 CAPSULE | COMMUNITY
Start: 2022-07-24

## 2022-07-24 RX ADMIN — ACETAMINOPHEN 1000 MG: 500 TABLET, FILM COATED ORAL at 12:00

## 2022-07-24 RX ADMIN — PROCHLORPERAZINE EDISYLATE 10 MG: 5 INJECTION INTRAMUSCULAR; INTRAVENOUS at 09:23

## 2022-07-24 RX ADMIN — METAXALONE 800 MG: 800 TABLET ORAL at 04:34

## 2022-07-24 RX ADMIN — PROCHLORPERAZINE EDISYLATE 10 MG: 5 INJECTION INTRAMUSCULAR; INTRAVENOUS at 04:32

## 2022-07-24 RX ADMIN — DOCUSATE SODIUM 100 MG: 100 CAPSULE, LIQUID FILLED ORAL at 05:48

## 2022-07-24 RX ADMIN — PROCHLORPERAZINE EDISYLATE 10 MG: 5 INJECTION INTRAMUSCULAR; INTRAVENOUS at 00:09

## 2022-07-24 RX ADMIN — METAXALONE 800 MG: 800 TABLET ORAL at 12:00

## 2022-07-24 RX ADMIN — PROMETHAZINE HYDROCHLORIDE 25 MG: 25 TABLET ORAL at 12:00

## 2022-07-24 RX ADMIN — ACETAMINOPHEN 1000 MG: 500 TABLET, FILM COATED ORAL at 00:10

## 2022-07-24 RX ADMIN — HYDROMORPHONE HYDROCHLORIDE 0.5 MG: 1 INJECTION, SOLUTION INTRAMUSCULAR; INTRAVENOUS; SUBCUTANEOUS at 04:39

## 2022-07-24 RX ADMIN — OXYCODONE HYDROCHLORIDE 10 MG: 10 TABLET ORAL at 05:48

## 2022-07-24 RX ADMIN — LIDOCAINE 1 PATCH: 50 PATCH TOPICAL at 05:50

## 2022-07-24 RX ADMIN — GABAPENTIN 100 MG: 100 CAPSULE ORAL at 04:31

## 2022-07-24 RX ADMIN — OXYCODONE HYDROCHLORIDE 10 MG: 10 TABLET ORAL at 00:09

## 2022-07-24 RX ADMIN — GABAPENTIN 100 MG: 100 CAPSULE ORAL at 00:09

## 2022-07-24 RX ADMIN — SERTRALINE 100 MG: 100 TABLET, FILM COATED ORAL at 05:48

## 2022-07-24 RX ADMIN — ACETAMINOPHEN 1000 MG: 500 TABLET, FILM COATED ORAL at 04:31

## 2022-07-24 RX ADMIN — CALCIUM CARBONATE 500 MG: 500 TABLET, CHEWABLE ORAL at 05:48

## 2022-07-24 RX ADMIN — OXYCODONE HYDROCHLORIDE 10 MG: 10 TABLET ORAL at 09:16

## 2022-07-24 ASSESSMENT — COGNITIVE AND FUNCTIONAL STATUS - GENERAL
SUGGESTED CMS G CODE MODIFIER DAILY ACTIVITY: CI
DAILY ACTIVITIY SCORE: 23
MOBILITY SCORE: 21
MOVING FROM LYING ON BACK TO SITTING ON SIDE OF FLAT BED: A LITTLE
SUGGESTED CMS G CODE MODIFIER MOBILITY: CJ
MOVING TO AND FROM BED TO CHAIR: A LITTLE
TURNING FROM BACK TO SIDE WHILE IN FLAT BAD: A LITTLE
HELP NEEDED FOR BATHING: A LITTLE

## 2022-07-24 ASSESSMENT — ENCOUNTER SYMPTOMS
RESPIRATORY NEGATIVE: 1
CARDIOVASCULAR NEGATIVE: 1
SENSORY CHANGE: 0
VOMITING: 0
NAUSEA: 1
EYES NEGATIVE: 1
ABDOMINAL PAIN: 0
WEAKNESS: 0
NECK PAIN: 1
FOCAL WEAKNESS: 0
CONSTITUTIONAL NEGATIVE: 1
TINGLING: 0
MYALGIAS: 1

## 2022-07-24 ASSESSMENT — GAIT ASSESSMENTS
DISTANCE (FEET): 120
GAIT LEVEL OF ASSIST: SUPERVISED
DEVIATION: NO DEVIATION

## 2022-07-24 ASSESSMENT — PAIN DESCRIPTION - PAIN TYPE
TYPE: ACUTE PAIN;SURGICAL PAIN

## 2022-07-24 ASSESSMENT — ACTIVITIES OF DAILY LIVING (ADL): TOILETING: INDEPENDENT

## 2022-07-24 NOTE — DISCHARGE INSTRUCTIONS
- Call or seek medical attention for questions or concerns  - Follow up with Neurosurgeon, Dr. Landers in 2 weeks time  - Follow up with primary care provider within one weeks time  - Resume regular diet  - May take over the counter acetaminophen as needed for pain  - Continue daily over the counter stool softener while on narcotics  - No operation of machinery or motorized vehicles while under the influence of narcotics  - No alcohol, marijuana or illicit drug use while under the influence of narcotics  - In the event of a narcotic overdose naloxone (Narcan) is available without a prescription from any [facility] or [facility]  - No swimming, hot tubs, baths or wound submersion until cleared by outpatient provider. May shower  - No contact sports, strenuous activities, or heavy lifting until cleared by outpatient provider  - If respiratory decompensation, persistent or worsening pain, weakness, paresthesias, difficulty swallowing, or signs or symptoms of infection occur seek medical attention

## 2022-07-24 NOTE — PROGRESS NOTES
Trauma / Surgical Daily Progress Note    Date of Service  7/24/2022    Chief Complaint  22 y.o. female admitted 7/21/2022 with cervical spine fracture after getting trampled by a horse.    POD # 2 Anterior cervical fusion.    Interval Events  Cleared to discharge home by neurosurgery.  Therapy recommendations pending.  Patient would like to discharge home ASAP.    - Mobilize with PT this AM for discharge recs  - Disposition: anticipate discharge home today    Review of Systems  Review of Systems   Constitutional: Negative.    HENT: Negative.    Eyes: Negative.    Respiratory: Negative.    Cardiovascular: Negative.    Gastrointestinal: Positive for nausea (baseline). Negative for abdominal pain and vomiting.   Genitourinary: Negative.         Voiding   Musculoskeletal: Positive for myalgias and neck pain.   Neurological: Negative for tingling, sensory change, focal weakness and weakness.        Vital Signs  Temp:  [36.2 °C (97.2 °F)-37.1 °C (98.8 °F)] 36.2 °C (97.2 °F)  Pulse:  [62-94] 76  Resp:  [16-33] 16  BP: (112-137)/(55-72) 117/55  SpO2:  [93 %-98 %] 95 %    Physical Exam  Physical Exam  Vitals reviewed.   Constitutional:       General: She is not in acute distress.     Interventions: Cervical collar in place.   Neck:      Comments: Anterior neck incision dressing clean, dry, and intact. Hemovac intact with serosanguinous drainage present.  Cardiovascular:      Rate and Rhythm: Normal rate and regular rhythm.      Heart sounds: Normal heart sounds.   Pulmonary:      Effort: Pulmonary effort is normal. No respiratory distress.      Breath sounds: Normal breath sounds.   Abdominal:      General: Bowel sounds are normal. There is no distension.      Palpations: Abdomen is soft.   Musculoskeletal:      Right lower leg: No edema.      Left lower leg: No edema.   Skin:     General: Skin is warm and dry.   Neurological:      Mental Status: She is alert and oriented to person, place, and time.      Comments: 5/5  strength bilateral upper & lower extremities.       Laboratory  Recent Results (from the past 24 hour(s))   CBC with Differential: Tomorrow AM    Collection Time: 07/24/22  3:59 AM   Result Value Ref Range    WBC 7.0 4.8 - 10.8 K/uL    RBC 3.82 (L) 4.20 - 5.40 M/uL    Hemoglobin 12.3 12.0 - 16.0 g/dL    Hematocrit 34.6 (L) 37.0 - 47.0 %    MCV 90.6 81.4 - 97.8 fL    MCH 32.2 27.0 - 33.0 pg    MCHC 35.5 (H) 33.6 - 35.0 g/dL    RDW 41.5 35.9 - 50.0 fL    Platelet Count 191 164 - 446 K/uL    MPV 9.5 9.0 - 12.9 fL    Neutrophils-Polys 62.50 44.00 - 72.00 %    Lymphocytes 25.70 22.00 - 41.00 %    Monocytes 10.10 0.00 - 13.40 %    Eosinophils 1.00 0.00 - 6.90 %    Basophils 0.40 0.00 - 1.80 %    Immature Granulocytes 0.30 0.00 - 0.90 %    Nucleated RBC 0.00 /100 WBC    Neutrophils (Absolute) 4.39 2.00 - 7.15 K/uL    Lymphs (Absolute) 1.81 1.00 - 4.80 K/uL    Monos (Absolute) 0.71 0.00 - 0.85 K/uL    Eos (Absolute) 0.07 0.00 - 0.51 K/uL    Baso (Absolute) 0.03 0.00 - 0.12 K/uL    Immature Granulocytes (abs) 0.02 0.00 - 0.11 K/uL    NRBC (Absolute) 0.00 K/uL   Comp Metabolic Panel (CMP): Tomorrow AM    Collection Time: 07/24/22  3:59 AM   Result Value Ref Range    Sodium 140 135 - 145 mmol/L    Potassium 3.8 3.6 - 5.5 mmol/L    Chloride 107 96 - 112 mmol/L    Co2 19 (L) 20 - 33 mmol/L    Anion Gap 14.0 7.0 - 16.0    Glucose 78 65 - 99 mg/dL    Bun 7 (L) 8 - 22 mg/dL    Creatinine 0.71 0.50 - 1.40 mg/dL    Calcium 8.9 8.5 - 10.5 mg/dL    AST(SGOT) 14 12 - 45 U/L    ALT(SGPT) <5 2 - 50 U/L    Alkaline Phosphatase 52 30 - 99 U/L    Total Bilirubin 0.9 0.1 - 1.5 mg/dL    Albumin 3.9 3.2 - 4.9 g/dL    Total Protein 6.0 6.0 - 8.2 g/dL    Globulin 2.1 1.9 - 3.5 g/dL    A-G Ratio 1.9 g/dL   ESTIMATED GFR    Collection Time: 07/24/22  3:59 AM   Result Value Ref Range    GFR (CKD-EPI) 124 >60 mL/min/1.73 m 2       Fluids    Intake/Output Summary (Last 24 hours) at 7/24/2022 0952  Last data filed at 7/24/2022 0400  Gross per 24 hour    Intake 240 ml   Output 55 ml   Net 185 ml       Core Measures & Quality Metrics  Labs reviewed, Medications reviewed and Radiology images reviewed  Calhoun catheter: No Calhoun      DVT Prophylaxis: Enoxaparin (Lovenox)  DVT prophylaxis - mechanical: SCDs  Ulcer prophylaxis: Not indicated        RAP Score Total: 4    CAGE Results: negative Blood Alcohol>0.08: no       Assessment/Plan  Closed displaced fracture of sixth cervical vertebra (HCC)- (present on admission)  Assessment & Plan  Referring facility imaging with unstable cervical injury with fracture of the anterior inferior C6 vertebral body, posterior translocation of the posterior fragment and widening of the C6-7 facet joints bilaterally compatible with disruption of the posterior discal ligament Tatian complex, posterior longitudinal ligament, and posterior interspinous/facet ligamentous complex.  By report, referring facility CTA negative.  Cervical MRI with increased T2 signal intensity at C6-7 interspinous spaces likely representing ligamentous injury. There is no spinal cord injury.  7/22 Anterior Cervical Fusion.  Cervical immobilization. x 6-12 weeks  Prasad Landers MD. Neurosurgeon. [facility] [place].   Outpatient follow up in 2 weeks.    Depression- (present on admission)  Assessment & Plan  Chronic condition treated with Zoloft.  7/23 Resumed maintenance medication.     No contraindication to deep vein thrombosis (DVT) prophylaxis- (present on admission)  Assessment & Plan  Prophylactic anticoagulation for thrombotic prevention initially contraindicated secondary to elevated bleeding risk.  7/23 Prophylactic dose enoxaparin initiated.  Cleared by neurosurgery.    Trauma- (present on admission)  Assessment & Plan  Helmeted, bucked off horse, then trampled on.   No LOC. Immediate BUE paresthesias.  Trauma Green Transfer Activation from [facility].  Mauricio Gaffney MD. Trauma Surgery.      Discussed patient condition with RN, Charge nurse /  hot rounds, Patient and trauma surgery, Dr. Gaffney.

## 2022-07-24 NOTE — CARE PLAN
The patient is Stable - Low risk of patient condition declining or worsening    Shift Goals  Clinical Goals: pain and nausea control, PT/OT evals  Patient Goals: discharge  Family Goals: pain control, rest    Progress made toward(s) clinical / shift goals:    Problem: Knowledge Deficit - Standard  Goal: Patient and family/care givers will demonstrate understanding of plan of care, disease process/condition, diagnostic tests and medications  Outcome: Progressing   POC discussed- pt verbalized understanding  Problem: Pain - Standard  Goal: Alleviation of pain or a reduction in pain to the patient’s comfort goal  Outcome: Progressing   Oxycodone given PRN with +results. Educated pt on importance of pain control- pt verbalized understanding.      Patient is not progressing towards the following goals:

## 2022-07-24 NOTE — DISCHARGE SUMMARY
Trauma Discharge Summary    DATE OF ADMISSION: 7/21/2022    DATE OF DISCHARGE: 7/24/2022    LENGTH OF STAY: 3 days    ATTENDING PHYSICIAN: Mauricio Gaffney M.D.    CONSULTING PHYSICIAN:   1. Dr. Prasad Landers MD, Neurosurgery    DISCHARGE DIAGNOSIS:  Active Problems:    Closed displaced fracture of sixth cervical vertebra (HCC) POA: Yes    Depression POA: Yes    Trauma POA: Yes    No contraindication to deep vein thrombosis (DVT) prophylaxis POA: Yes    History of Chiari malformation POA: Yes  Resolved Problems:    * No resolved hospital problems. *      PROCEDURES:  1. Anterior cervical discectomy with fusion and corpectomy on 7/22/22 by Dr. Prasad Landers.    HISTORY OF PRESENT ILLNESS: The patient is a 22 y.o. female who was reportedly injured in after getting bucked off a horse and then subsequently trampled. She was initially evaluated at [facility] and transferred to [facility] in [place], [place] for further trauma evaluation.    HOSPITAL COURSE: The patient was triaged as a consult transfer activation. She was found to have a unstable C6 spine fracture.The patient was transported to the trauma intensive care unit where she received serial neurological exams. Neurosurgery was consulted who took the patient for operative stabilization as listed above. Postoperatively, her hospital course was uncomplicated and she was transferred to the orthopedic spine [facility]. She was evaluated by physical and occupational therapy who recommended the patient was safe to discharge home.    On day of discharge, the patient was tolerating room air and a regular diet. She had adequate pain control. She was able to mobilize safely independently. She remained neurologically intact. She was discharged home with outpatient follow up as discussed below.    HOSPITAL PROBLEM LIST:  Closed displaced fracture of sixth cervical vertebra (HCC)- (present on admission)  Assessment & Plan  Referring facility imaging  with unstable cervical injury with fracture of the anterior inferior C6 vertebral body, posterior translocation of the posterior fragment and widening of the C6-7 facet joints bilaterally compatible with disruption of the posterior discal ligament Tatian complex, posterior longitudinal ligament, and posterior interspinous/facet ligamentous complex.  By report, referring facility CTA negative.  Cervical MRI with increased T2 signal intensity at C6-7 interspinous spaces likely representing ligamentous injury. There is no spinal cord injury.  7/22 Anterior Cervical Fusion.  Cervical immobilization. x 6-12 weeks  Prasad Landers MD. Neurosurgeon. [facility] [place].   Outpatient follow up in 2 weeks.    Depression- (present on admission)  Assessment & Plan  Chronic condition treated with Zoloft.  7/23 Resumed maintenance medication.     No contraindication to deep vein thrombosis (DVT) prophylaxis- (present on admission)  Assessment & Plan  Prophylactic anticoagulation for thrombotic prevention initially contraindicated secondary to elevated bleeding risk.  7/23 Prophylactic dose enoxaparin initiated.  Cleared by neurosurgery.    Trauma- (present on admission)  Assessment & Plan  Helmeted, bucked off horse, then trampled on.   No LOC. Immediate BUE paresthesias.  Trauma Green Transfer Activation from [facility].  Mauricio Gaffney MD. Trauma Surgery.      DISPOSITION: Discharged home on 7/24/22. The patient was counseled and questions were answered. Specifically, signs and symptoms of infection, respiratory decompensation, and persistent or worsening pain were discussed and the patient agrees to seek medical attention if any of these develop.    DISCHARGE MEDICATIONS:  The patients controlled substance history was reviewed and a controlled substance use informed consent (if applicable) was provided by [facility] and the patient has been prescribed.       Medication List      ASK your doctor  about these medications      Instructions   sertraline 100 MG Tabs  Commonly known as: Zoloft   Take 100 mg by mouth every day.  Dose: 100 mg     TIGAN PO   Take 1 Tablet by mouth 1 time a day as needed (nausea).  Dose: 1 Tablet            ACTIVITY: as tolerated. Must wear cervical collar continuously for 6-12 weeks.    WOUND CARE: N/A    DIET:  Orders Placed This Encounter   Procedures   • Diet Order Diet: Regular (No Sausage please.)     Standing Status:   Standing     Number of Occurrences:   1     Order Specific Question:   Diet:     Answer:   Regular [1]     Comments:   No Sausage please.       FOLLOW UP:  Prasad Landers M.D.  [address]  [place] [address]  [phone]    Schedule an appointment as soon as possible for a visit in 2 week(s)  Follow up with Neurosurgeon Dr. Landers in 2 weeks.    PEDRO Scott  [address]  [facility] [place] [id number] [phone]    Schedule an appointment as soon as possible for a visit in 1 week(s)  Follow up with your primary care provider to discuss your recent hospitalization and chronic nausea.      TIME SPENT ON DISCHARGE: 33 minutes      ____________________________________________  CIELO Shrestha    DD: 7/24/2022 11:01 AM

## 2022-07-24 NOTE — CARE PLAN
The patient is Stable - Low risk of patient condition declining or worsening    Shift Goals  Clinical Goals: pain control, rest, hemovac output  Patient Goals: pain control, rest  Family Goals: pain control, rest    Progress made toward(s) clinical / shift goals:    Problem: Pain - Standard  Goal: Alleviation of pain or a reduction in pain to the patient’s comfort goal  Outcome: Progressing   The patient's pain is well managed with her scheduled and PRN medications   Problem: Neuro Status  Goal: Neuro status will remain stable or improve  Outcome: Progressing   The patient's neuro status has stayed the same with no deficits in her neuro status.   Problem: Mobility  Goal: Patient's capacity to carry out activities will improve  Outcome: Progressing   The patient is mobilizing in her room without the need for assistance     Patient is not progressing towards the following goals:

## 2022-07-24 NOTE — PROGRESS NOTES
Neurosurgery Progress Note    Subjective:  22 yr old female presenting as a helmeted rider after a fall from a horse. Found to have C6 vertebral body fracture.   Initially presenting with severe b/l hand paresthesias and right hand weakness.     POD#2 C6 corpectomy  Patient improved  Mild neck pain  Previous arm paresthesia now resoleved, no radicular pain  Drain at 15cc overnight  Ambulating, voiding, tolerating PO  No nausea this morning     Had suboccipital craniotomy for Chiari several years ago at the [facility]   Upright x-rays  look good     Exam:  Pleasant and cooperative; in no acute distress.  Cervical collar on  Voice is strong  Incision is intact and soft   Upper motor strength 5/5  Sensation intact to upper extremities  No Hoffmans, no clonus      BP  Min: 112/62  Max: 137/67  Pulse  Av  Min: 62  Max: 94  Resp  Av.5  Min: 16  Max: 33  Temp  Av.5 °C (97.7 °F)  Min: 36.2 °C (97.2 °F)  Max: 37.1 °C (98.8 °F)  SpO2  Av %  Min: 93 %  Max: 98 %    No data recorded    Recent Labs     22  0525 22  0619 22  0359   WBC 7.0 11.1* 7.0   RBC 4.10* 4.02* 3.82*   HEMOGLOBIN 13.2 13.0 12.3   HEMATOCRIT 35.9* 36.4* 34.6*   MCV 87.6 90.5 90.6   MCH 32.2 32.3 32.2   MCHC 36.8* 35.7* 35.5*   RDW 41.2 41.8 41.5   PLATELETCT 209 210 191   MPV 9.4 9.3 9.5     Recent Labs     22  0525 22  0619 22  0359   SODIUM 142 138 140   POTASSIUM 3.9 3.9 3.8   CHLORIDE 112 108 107   CO2 18* 16* 19*   GLUCOSE 94 93 78   BUN 11 9 7*   CREATININE 0.73 0.71 0.71   CALCIUM 8.5 8.6 8.9     Recent Labs     22  1747   APTT 26.8   INR 1.14*           Intake/Output                       22 0700 - 22 0659 07/700 - 22 06 Total  Total                 Intake    P.O.  480  -- 480  --  -- --    P.O. 480 -- 480 -- -- --    I.V.  200  -- 200  --  -- --    Volume (mL) (NS infusion) 200 -- 200 -- -- --    Total Intake 680 --   -- -- --       Output    Urine  --  -- --  --  -- --    Number of Times Voided 2 x -- 2 x -- -- --    Emesis  0  -- 0  --  -- --    Emesis 0 -- 0 -- -- --    Drains  20  35 55  --  -- --    Output (mL) (Closed/Suction Drain Neck Hemovac) 20 35 55 -- -- --    Stool  --  -- --  --  -- --    Number of Times Stooled 0 x -- 0 x -- -- --    Total Output 20 35 55 -- -- --       Net I/O     660 -35 625 -- -- --            Intake/Output Summary (Last 24 hours) at 7/24/2022 0927  Last data filed at 7/24/2022 0400  Gross per 24 hour   Intake 240 ml   Output 55 ml   Net 185 ml            • labetalol  10 mg Q4HRS PRN   • enoxaparin (LOVENOX) injection  40 mg DAILY AT 1800   • sertraline  100 mg DAILY   • Pharmacy Consult Request  1 Each PHARMACY TO DOSE   • MD ALERT...DO NOT ADMINISTER NSAIDS or ASPIRIN unless ORDERED By Neurosurgery  1 Each PRN   • docusate sodium  100 mg BID   • senna-docusate  1 Tablet Nightly   • senna-docusate  1 Tablet Q24HRS PRN   • polyethylene glycol/lytes  1 Packet BID PRN   • magnesium hydroxide  30 mL QDAY PRN   • bisacodyl  10 mg Q24HRS PRN   • sodium phosphate  1 Each Once PRN   • diphenhydrAMINE  25 mg Q6HRS PRN    Or   • diphenhydrAMINE  25 mg Q6HRS PRN   • promethazine  12.5-25 mg Q4HRS PRN   • promethazine  12.5-25 mg Q4HRS PRN   • prochlorperazine  5-10 mg Q4HRS PRN   • benzocaine-menthol  1 Lozenge Q2HRS PRN   • calcium carbonate  500 mg BID   • Respiratory Therapy Consult   Continuous RT   • polyethylene glycol/lytes  1 Packet BID   • magnesium hydroxide  30 mL DAILY   • sodium phosphate  1 Each Once PRN   • acetaminophen  1,000 mg Q6HRS    Followed by   • [START ON 7/26/2022] acetaminophen  1,000 mg Q6HRS PRN   • oxyCODONE immediate-release  5 mg Q3HRS PRN    Or   • oxyCODONE immediate-release  10 mg Q3HRS PRN    Or   • HYDROmorphone  0.5 mg Q3HRS PRN   • lidocaine  1 Patch Q24HRS   • gabapentin  100 mg Q8HRS   • metaxalone  800 mg TID       Assessment and Plan:  Hospital day #4  POD#2 C6  corpectomy   D/c drain  Mobilize  PT/OT  Collar on for 6-12 weeks   Patient doing very well  I suspect she may be able to d/c home today  Pt will f/u in 2 weeks  Went over d/c instructions   Answered all questions        Chemical prophylactic DVT therapy: Yes - Lovenox 40mg/qd    Start date/time: 7/23

## 2022-07-24 NOTE — THERAPY
Physical Therapy   Initial Evaluation     Patient Name: Savanah Alvarenga  Age:  22 y.o., Sex:  female  Medical Record #: [id number]  Today's Date: 7/24/2022     Precautions  Precautions: Spinal / Back Precautions ;Cervical Collar    Comments: c-collar on at all times with exception to showers    Assessment  Patient is 22 y.o. female admitted post fall from horse sustaining unstable C6 fracture. POD #2 anterior fusion C6. Pt provided with spine packet and educated on precautions. Pt able to complete all mobility without assist or AD. No further acute PT needs.     Plan    Recommend Physical Therapy for Evaluation only     DC Equipment Recommendations: None  Discharge Recommendations: Anticipate that the patient will have no further physical therapy needs after discharge from the hospital        07/24/22 1029   Prior Living Situation   Prior Services Home-Independent   Housing / Facility 1 Story House   Steps Into Home 1   Steps In Home 0   Equipment Owned None   Lives with - Patient's Self Care Capacity Other (Comments)   Comments pt is visiting her brother from [place]. She plans to stay with her brother until her 2 week follow up then return to [facility]   Prior Level of Functional Mobility   Bed Mobility Independent   Transfer Status Independent   Ambulation Independent   Distance Ambulation (Feet)   (community)   Assistive Devices Used None   Stairs Independent   Cognition    Level of Consciousness Alert   Comments receptive   Active ROM Lower Body    Active ROM Lower Body  WDL   Strength Lower Body   Lower Body Strength  WDL   Sensation Lower Body   Lower Extremity Sensation   WDL   Coordination Lower Body    Coordination Lower Body  WDL   Balance Assessment   Sitting Balance (Static) Good   Sitting Balance (Dynamic) Good   Standing Balance (Static) Fair +   Standing Balance (Dynamic) Fair +   Weight Shift Sitting Good   Weight Shift Standing Good   Comments no AD   Gait Analysis   Gait Level Of Assist Supervised    Assistive Device None   Distance (Feet) 120   # of Times Distance was Traveled 1   Deviation No deviation   Weight Bearing Status no restrictions   Comments no LOB   Bed Mobility    Supine to Sit Supervised   Scooting Supervised   Comments log roll   Functional Mobility   Sit to Stand Supervised   Bed, Chair, Wheelchair Transfer Supervised   Toilet Transfers Supervised   Transfer Method Stand Step   Mobility in room and hallway with no AD   Education Group   Education Provided Role of Physical Therapist;Cervical Precautions   Cervical Precautions Patient Response Patient;Acceptance;Explanation;Handout;Verbal Demonstration;Action Demonstration   Role of Physical Therapist Patient Response Patient;Acceptance;Explanation;Demonstration;Verbal Demonstration;Action Demonstration   Anticipated Discharge Equipment and Recommendations   DC Equipment Recommendations None   Discharge Recommendations Anticipate that the patient will have no further physical therapy needs after discharge from the hospital

## 2022-07-24 NOTE — THERAPY
"Occupational Therapy   Initial Evaluation     Patient Name: Savanah Alvarenga  Age:  22 y.o., Sex:  female  Medical Record #: [id number]  Today's Date: 7/24/2022     Precautions: Fall Risk, Spinal / Back Precautions , Cervical Collar    Comments: collar OAT except shower per pt    Assessment  Patient is 22 y.o. female with a diagnosis of trauma after getting bucked off horse and trampled, resulting in closed displaced fracture of sixth cervical vertebra pt is s/p anterior fusion doing well. Pt has UE strength and coordination WDL fair pain control and demonstrated ability to complete ADL's and txfs. Pt was educated on brace wear/care and adaptations to ADL's. Anticipate no further acute OT needs    Plan  Recommend Occupational Therapy for Evaluation only     DC Equipment Recommendations:  None  Discharge Recommendations: Anticipate that the patient will have no further occupational therapy needs after discharge from the hospital     Subjective  \"The pain isn't too bad as long as I get my pain meds\"      Objective     07/24/22 1035   Charge Group   OT Evaluation OT Evaluation Low   Total Time Spent   OT Time Spent Yes   OT Evaluation (Minutes) 15   Initial Contact Note    Initial Contact Note Order Received and Verified, Evaluation Only - Patient Does Not Require Further Acute Occupational Therapy at this Time.  However, May Benefit from Post Acute Therapy for Higher Level Functional Deficits.   Prior Living Situation   Prior Services None   Housing / Facility 1 Story House   Steps Into Home 1   Steps In Home 0   Bathroom Set up Walk In Shower;Bathtub / Shower Combination   Equipment Owned None   Lives with - Patient's Self Care Capacity Other (Comments)   Comments pt is from [facility] [place] [facility] but will be staying here locally w/her brother ~2 weeks until cleared to travel back to [facility] [place] [facility] where she resides w/family   Prior Level of ADL Function   Self Feeding Independent   Grooming / Hygiene Independent   Bathing Independent   Dressing " Independent   Toileting Independent   Prior Level of IADL Function   Medication Management Independent   Laundry Independent   Kitchen Mobility Independent   Finances Independent   Home Management Independent   Shopping Independent   Prior Level Of Mobility Independent Without Device in Community   Driving / Transportation Driving Independent   Occupation (Pre-Hospital Vocational) Employed Full Time   Precautions   Precautions Fall Risk;Spinal / Back Precautions ;Cervical Collar     Comments collar OAT except shower per pt   Pain 0 - 10 Group   Location Back;Neck   Location Orientation Anterior;Posterior   Therapist Pain Assessment During Activity;Nurse Notified;4   Cognition    Cognition / Consciousness WDL   Level of Consciousness Alert   Comments pleasant and motivated   Passive ROM Upper Body   Passive ROM Upper Body WDL   Active ROM Upper Body   Active ROM Upper Body  WDL   Strength Upper Body   Upper Body Strength  WDL   Sensation Upper Body   Upper Extremity Sensation  WDL   Upper Body Muscle Tone   Upper Body Muscle Tone  WDL   Neurological Concerns   Neurological Concerns No   Coordination Upper Body   Coordination WDL   Balance Assessment   Sitting Balance (Static) Good   Sitting Balance (Dynamic) Fair +   Standing Balance (Static) Fair   Standing Balance (Dynamic) Fair   Weight Shift Sitting Good   Weight Shift Standing Fair   Comments no AD no LOB   Bed Mobility    Supine to Sit Supervised   ADL Assessment   Grooming Supervision;Standing   Upper Body Dressing Supervision   Lower Body Dressing Supervision   Toileting Supervision   How much help from another person does the patient currently need...   6 Clicks Daily Activity Score 23   Functional Mobility   Sit to Stand Supervised   Bed, Chair, Wheelchair Transfer Supervised   Toilet Transfers Supervised   Mobility walking in room no AD   Visual Perception   Visual Perception  Not Tested   Edema / Skin Assessment   Edema / Skin  Not Assessed   Activity  Tolerance   Comments no overt c/o fatigue or pain   Education Group   Education Provided Back Safety;Brace Wear and Care;Home Safety;Role of Occupational Therapist;Activities of Daily Living   Role of Occupational Therapist Patient Response Patient;Acceptance;Explanation;Demonstration   Back Safety Patient Response Patient;Acceptance;Explanation;Demonstration   Brace Wear & Care Patient Response Patient;Acceptance;Explanation;Demonstration   Home Safety Patient Response Patient;Acceptance;Explanation;Demonstration   ADL Patient Response Patient;Acceptance;Demonstration;Explanation   Problem List   Problem List None

## 2022-07-26 NOTE — OP REPORT
DATE OF SERVICE:  07/22/2022     PREOPERATIVE DIAGNOSES:  1.  C6 vertebral body fracture with retropulsion.  2.  Facet distraction at C6-C7.  3.  Three-column injury, unstable fracture.     POSTOPERATIVE DIAGNOSES:  1.  C6 vertebral body fracture with retropulsion.  2.  Facet distraction at C6-C7.  3.  Three-column injury, unstable fracture.     PROCEDURES PERFORMED:  1.  C5-C6, C6-C7 anterior cervical diskectomy for decompression of thecal sac   and bilateral foramen.  2.  C6 corpectomy with greater than 90% of vertebral body removed.  3.  Placement of titanium expandable interbody C6, Chelsea Pam corpectomy cage   measuring [id number] degrees with up to 20 degrees lordosis.  4.  Arthrodesis C5-C7 with autologous bone graft and allograft, demineralized   bone matrix.  5.  Placement of anterior cervical plate, C5-C7, Irina Thurston anterior   cervical plate.  6.  Use of intraoperative monitoring.  7.  Use of intraoperative microscope.     SURGEON:  Prasad Landers MD     ASSISTANT:  Amy Villalta PA-C.     ANESTHESIOLOGIST:  Jc Cai MD     ANESTHESIA:  General.     ESTIMATED:  General endotracheal anesthesia.     COMPLICATIONS:  None.     FINDINGS:  Well-positioned interbody with alignment of spine.     BLOOD LOSS:  200 mL.     INDICATIONS FOR PROCEDURE:  The patient is a 22-year-old female who fell off a   horse on 07/21/2022, landing on her neck.  Fortunately, the patient was   wearing a helmet, but fractured C6 vertebral body.  MRI demonstrated   retrolisthesis of the fracture as well as injury to the facets suggesting an   unstable fracture.  Given that the vertebral body was fractured a corpectomy   was indicated.  I discussed surgical options with the patient as well as risks   and benefits includes bleeding, infection, postoperative hematoma, damage to   major blood vessels, damage to esophagus, failure of instrumentation, need for   additional surgery.  Additionally, there is a risk of injury  to neural tissue   that include, but not limited to paralysis, sensory changes, bowel or bladder   dysfunction and sexual dysfunction.  There is also risk of general   anesthetic, that includes not limited to myocardial infarction, stroke,   inability to extubate and rare instances of death.  Despite these risks, the   patient wished to proceed with the procedure.     DESCRIPTION OF PROCEDURE:  Informed consent was obtained by the patient.  The   patient was brought to the operating theater and induced by anesthesia.    Neuromonitoring was hooked up to all 4 extremities with strong signal on a   Monster OSI table and chinstrap was applied and under live fluoroscopy   carefully applied traction to 10 pounds ensuring that the cervical spine was   maintained alignment.  An incision was planned over the C6 vertebral body.    The patient was prepped and draped in sterile fashion.  A timeout occurred and   preoperative antibiotics were given.  Incision was infiltrated with Marcaine   and a transverse incision was made on the right side of the neck of the C6   vertebral body.  Using sharp dissection, I incised the platysma undermined to   allow exposure.  I identified the deep cervical fascia and then followed that   plane down to the anterior vertebral column.  Using Kitners I dissected the   prevertebral fascia and then placed a spinal marker to determine the C6-C7   disk.  Bovie electrocautery to reflect longus colli laterally and then placed   retractors into the operative field.  Before placing the retractor I verified   the carotid artery as being lateral to the retractors.  Continued to expose   the vertebral body of C6 as well as the caudal portion of C5 and rostral   portion of C7.  I next placed Adkins pins into the vertebral body of C5 and C7   under x-ray fluoroscopy and placed Adkins pin distractor.  At this point, I   brought in the intraoperative microscope for microscopic dissection.  I began   at C6-C7 for  the diskectomy.  She had a fracture fragment on the caudal   portion of C6.  This was removed with pituitary rongeur and hemostasis was   obtained.  I used 15 blade to incise the disk in an upgoing curette to remove   disk fragments as well as pituitary rongeur.  I used a high-speed bur to drill   down the disk to the level of the PLL.  The patient being young the disk   material was quite fibrous.  It took considerable amount of effort to remove.    Identified the endplate of C7 using upgoing curette to remove disk material.    We got down to the level of the PLL and then continued my diskectomy   laterally depressed the foramen.  We then obtained hemostasis at this level   and moved up to the disk space at C5-C6.  Again, I incised this with a 15   blade and removed the disk with an upgoing curette and pituitary rongeurs.  I   continued dissection down with a high-speed bur at the level of the PLL and   decompress laterally to open the foramen bilaterally.  Diskectomy was   completed began the corpectomy portion identified the uncinate on either side   and then used a high-speed bur to drill out a trough bilaterally.  I removed   the majority of the vertebral body with rongeurs, saving the autologous bone   for arthrodesis.  I continued high-speed bur through the vertebral body down   to the level of the PLL that was verified by x-ray.  With PLL identified.  I   used an upgoing curette to obtain a plane between the PLL and the bone and   used Kerrison rongeurs to expand the corpectomy laterally.  I continued to   drill lateral portions careful not to penetrate into the vertebral artery.  I   used the lollipop sizer to ensure that I had a sufficient corpectomy for the   interbody.  I continued to drill out laterally until this was achieved.  Once   this was achieved, I trialled several sizes deciding on a 12/14 mm footplate   with expandable cage that expanded from 20-24 mm.  This titanium expandable   interbody was  packed with Abdirahman demineralized bone matrix as well as   autologous graft for arthrodesis.  Under fluoroscopy, I carefully malleted   into the corpectomy space at C6.  It fit rather nicely in all dimensions.  I   gently tapped it into the space to where it had contact with both endplates at   C5 and C7.  Once satisfied with the position, I carefully expanded it to wear   it engaged both endplates and then I increased the lordosis of the interbody   to match that of her native spine.  Once in position, I obtained for   additional fluoroscopic images.  I was very satisfied with the placement.  I   disconnected the , placed additional bone graft into the interbody.  I   placed locking nuts and the interbody and torqued them out.  I obtained   hemostasis with the corpectomy cage in place, I fitted the anterior titanium   plate decided on a 38 mm Eveleth plate.  I predrilled the screw holes in the C5   and C7 levels before placing 14 mm variable screws and locked them in place.    I obtained hemostasis, removed the retractors, obtained final x-rays and was   very satisfied with positioning of all instrumentation.  I verified again that   there was no active bleeding.  I placed a drain deep over the anterior plate.    Closed the wound in layers with the skin being closed with Dermabond.  The   patient was turned over to anesthesia for extubation.  There were no   complications noted.  Sponge and needle counts were correct x2.    Neuromonitoring was stable throughout the case.        ______________________________  MD KOBE Rojas/GURINDER    DD:  07/26/2022 07:43  DT:  07/26/2022 08:58    Job#:  [id number]

## (undated) DEVICE — ELECTRODE DUAL RETURN W/ CORD - (50/PK)

## (undated) DEVICE — PROTECTOR ULNA NERVE - (36PR/CA)

## (undated) DEVICE — RESTRAINTS LIMB DISP. - (12/BX 4BX/CA)

## (undated) DEVICE — DRAPE MICROSCOPE ARMATEC 120IN X 46IN (10EA/CA)

## (undated) DEVICE — SENSOR OXIMETER ADULT SPO2 RD SET (20EA/BX)

## (undated) DEVICE — SODIUM CHL IRRIGATION 0.9% 1000ML (12EA/CA)

## (undated) DEVICE — LACTATED RINGERS INJ. 500 ML - (24EA/CA)

## (undated) DEVICE — NEEDLE SPINAL NON-SAFETY 18 GA X 3 IN (25EA/BX)

## (undated) DEVICE — INTRAOP NEURO IN OR 1:1 PER 15 MIN

## (undated) DEVICE — SLEEVE, VASO, THIGH, MED

## (undated) DEVICE — BONE PRESS SPINAL EDITION HENSLER (10EA/CA)

## (undated) DEVICE — FORCEPS ELECTROSURGICAL DISPOSABLE CODMAN 8IN 1.5MM

## (undated) DEVICE — DRAPE C ARMOR (12EA/CA)

## (undated) DEVICE — GOWN SURGICAL X-LARGE ULTRA - FILM-REINFORCED (20/CA)

## (undated) DEVICE — GLOVE SIZE 7.0 SURGEON ACCELERATOR FREE GREEN (50PR/BX 4BX/CA)

## (undated) DEVICE — GLOVE SZ 8 BIOGEL PI MICRO - PF LF (50PR/BX)

## (undated) DEVICE — DISSECT TOOL MIDAS REX AM-8D

## (undated) DEVICE — KIT SURGIFLO W/OUT THROMBIN - (6EA/BX)

## (undated) DEVICE — RESERVOIR SUCTION 100 CC - SILICONE (20EA/CA)

## (undated) DEVICE — GLOVE COTTON STERILE (50/CA)

## (undated) DEVICE — DRAPE STRLE REG TOWEL 18X24 - (10/BX 4BX/CA)"

## (undated) DEVICE — SET EXTENSION WITH 2 PORTS (48EA/CA) ***PART #2C8610 IS A SUBSTITUTE*****

## (undated) DEVICE — PACK NEURO - (3EA/CA)

## (undated) DEVICE — SET LEADWIRE 5 LEAD BEDSIDE DISPOSABLE ECG (1SET OF 5/EA)

## (undated) DEVICE — GOWN WARMING STANDARD FLEX - (30/CA)

## (undated) DEVICE — TUBING CLEARLINK DUO-VENT - C-FLO (48EA/CA)

## (undated) DEVICE — SCREW DISTRACTION 14MM YELLOW - STERILE (10EA/BX) (5TX4=20)

## (undated) DEVICE — CANISTER SUCTION 3000ML MECHANICAL FILTER AUTO SHUTOFF MEDI-VAC NONSTERILE LF DISP (40EA/CA)

## (undated) DEVICE — SUTURE GENERAL

## (undated) DEVICE — TUBING C&T SET FLYING LEADS DRAIN TUBING (10EA/BX)

## (undated) DEVICE — MIDAS LUBRICATOR DIFFUSER PACK (4EA/CA)

## (undated) DEVICE — LACTATED RINGERS INJ 1000 ML - (14EA/CA 60CA/PF)

## (undated) DEVICE — TOWEL STOP TIMEOUT SAFETY FLAG (40EA/CA)

## (undated) DEVICE — BONE MILL BM210

## (undated) DEVICE — SUTURE 3-0 VICRYL PLUS RB-1 - 8 X 18 INCH (12/BX)

## (undated) DEVICE — CHLORAPREP 26 ML APPLICATOR - ORANGE TINT(25/CA)

## (undated) DEVICE — PEN SKIN MARKER W/RULER - (50EA/BX)

## (undated) DEVICE — BOVIE BLADE COATED &INSULATED - 25/PK

## (undated) DEVICE — SUCTION INSTRUMENT YANKAUER BULBOUS TIP W/O VENT (50EA/CA)

## (undated) DEVICE — DRAIN JACKSON PRATT 10FR - (10/CS)

## (undated) DEVICE — SURGIFOAM (12X7) - (12EA/CA)

## (undated) DEVICE — BLADE SURGICAL CLIPPER - (50EA/CA)

## (undated) DEVICE — MASK ANESTHESIA ADULT - (100/CA)

## (undated) DEVICE — SHEET PEDIATRIC LAPAROTOMY - (10/CA)

## (undated) DEVICE — KIT ANESTHESIA W/CIRCUIT & 3/LT BAG W/FILTER (20EA/CA)

## (undated) DEVICE — SPONGE PEANUT - (5/PK 50PK/CA)

## (undated) DEVICE — SPONGE GAUZESTER 4 X 4 4PLY - (128PK/CA)

## (undated) DEVICE — ELECTRODE 850 FOAM ADHESIVE - HYDROGEL RADIOTRNSPRNT (50/PK)